# Patient Record
Sex: FEMALE | Race: WHITE | NOT HISPANIC OR LATINO | Employment: OTHER | URBAN - METROPOLITAN AREA
[De-identification: names, ages, dates, MRNs, and addresses within clinical notes are randomized per-mention and may not be internally consistent; named-entity substitution may affect disease eponyms.]

---

## 2024-02-27 ENCOUNTER — APPOINTMENT (OUTPATIENT)
Dept: CARDIOLOGY | Facility: HOSPITAL | Age: 84
DRG: 481 | End: 2024-02-27
Payer: MEDICARE

## 2024-02-27 ENCOUNTER — HOSPITAL ENCOUNTER (INPATIENT)
Facility: HOSPITAL | Age: 84
LOS: 3 days | Discharge: NURSING, REHAB OR LONG-TERM CARE | DRG: 481 | End: 2024-03-01
Attending: STUDENT IN AN ORGANIZED HEALTH CARE EDUCATION/TRAINING PROGRAM | Admitting: INTERNAL MEDICINE
Payer: MEDICARE

## 2024-02-27 ENCOUNTER — APPOINTMENT (OUTPATIENT)
Dept: RADIOLOGY | Facility: HOSPITAL | Age: 84
DRG: 481 | End: 2024-02-27
Payer: MEDICARE

## 2024-02-27 DIAGNOSIS — I44.0 FIRST DEGREE AV BLOCK: ICD-10-CM

## 2024-02-27 DIAGNOSIS — R55 SYNCOPE, UNSPECIFIED SYNCOPE TYPE: ICD-10-CM

## 2024-02-27 DIAGNOSIS — R94.31 ABNORMAL ELECTROCARDIOGRAM (ECG) (EKG): ICD-10-CM

## 2024-02-27 DIAGNOSIS — R79.89 TROPONIN LEVEL ELEVATED: ICD-10-CM

## 2024-02-27 DIAGNOSIS — K59.00 CONSTIPATION, UNSPECIFIED CONSTIPATION TYPE: ICD-10-CM

## 2024-02-27 DIAGNOSIS — I45.10 RIGHT BUNDLE BRANCH BLOCK (RBBB) ON ELECTROCARDIOGRAM (ECG): ICD-10-CM

## 2024-02-27 DIAGNOSIS — R79.89 ELEVATED TROPONIN: ICD-10-CM

## 2024-02-27 DIAGNOSIS — S72.142A CLOSED DISPLACED INTERTROCHANTERIC FRACTURE OF LEFT FEMUR, INITIAL ENCOUNTER (MULTI): Primary | ICD-10-CM

## 2024-02-27 PROBLEM — I16.1 HYPERTENSIVE EMERGENCY: Status: ACTIVE | Noted: 2024-02-27

## 2024-02-27 PROBLEM — I10 HYPERTENSION: Status: ACTIVE | Noted: 2024-02-27

## 2024-02-27 PROBLEM — M85.80 OSTEOPENIA: Status: ACTIVE | Noted: 2024-02-27

## 2024-02-27 PROBLEM — M50.30 DDD (DEGENERATIVE DISC DISEASE), CERVICAL: Status: ACTIVE | Noted: 2024-02-27

## 2024-02-27 PROBLEM — E78.5 HYPERLIPIDEMIA: Status: ACTIVE | Noted: 2024-02-27

## 2024-02-27 PROBLEM — M41.9 SCOLIOSIS: Status: ACTIVE | Noted: 2024-02-27

## 2024-02-27 PROBLEM — E87.1 HYPONATREMIA: Status: ACTIVE | Noted: 2024-02-27

## 2024-02-27 PROBLEM — M32.9 SLE (SYSTEMIC LUPUS ERYTHEMATOSUS RELATED SYNDROME) (MULTI): Status: ACTIVE | Noted: 2024-02-27

## 2024-02-27 PROBLEM — L43.9 LICHEN PLANUS: Status: ACTIVE | Noted: 2024-02-27

## 2024-02-27 PROBLEM — E03.9 HYPOTHYROIDISM: Status: ACTIVE | Noted: 2024-02-27

## 2024-02-27 LAB
ABO GROUP (TYPE) IN BLOOD: NORMAL
ALBUMIN SERPL BCP-MCNC: 4.3 G/DL (ref 3.4–5)
ALP SERPL-CCNC: 49 U/L (ref 33–136)
ALT SERPL W P-5'-P-CCNC: 19 U/L (ref 7–45)
ANION GAP SERPL CALC-SCNC: 12 MMOL/L (ref 10–20)
ANTIBODY SCREEN: NORMAL
APPEARANCE UR: CLEAR
APTT PPP: 28 SECONDS (ref 27–38)
AST SERPL W P-5'-P-CCNC: 27 U/L (ref 9–39)
BASOPHILS # BLD AUTO: 0.06 X10*3/UL (ref 0–0.1)
BASOPHILS NFR BLD AUTO: 0.4 %
BILIRUB SERPL-MCNC: 0.5 MG/DL (ref 0–1.2)
BILIRUB UR STRIP.AUTO-MCNC: NEGATIVE MG/DL
BNP SERPL-MCNC: 268 PG/ML (ref 0–99)
BUN SERPL-MCNC: 19 MG/DL (ref 6–23)
CALCIUM SERPL-MCNC: 9.2 MG/DL (ref 8.6–10.3)
CARDIAC TROPONIN I PNL SERPL HS: 156 NG/L (ref 0–13)
CARDIAC TROPONIN I PNL SERPL HS: 160 NG/L (ref 0–13)
CHLORIDE SERPL-SCNC: 96 MMOL/L (ref 98–107)
CK SERPL-CCNC: 306 U/L (ref 0–215)
CO2 SERPL-SCNC: 27 MMOL/L (ref 21–32)
COLOR UR: YELLOW
CREAT SERPL-MCNC: 0.73 MG/DL (ref 0.5–1.05)
EGFRCR SERPLBLD CKD-EPI 2021: 82 ML/MIN/1.73M*2
EOSINOPHIL # BLD AUTO: 0.09 X10*3/UL (ref 0–0.4)
EOSINOPHIL NFR BLD AUTO: 0.5 %
ERYTHROCYTE [DISTWIDTH] IN BLOOD BY AUTOMATED COUNT: 12.9 % (ref 11.5–14.5)
GLUCOSE SERPL-MCNC: 197 MG/DL (ref 74–99)
GLUCOSE UR STRIP.AUTO-MCNC: NEGATIVE MG/DL
HCT VFR BLD AUTO: 41 % (ref 36–46)
HGB BLD-MCNC: 13.5 G/DL (ref 12–16)
IMM GRANULOCYTES # BLD AUTO: 0.05 X10*3/UL (ref 0–0.5)
IMM GRANULOCYTES NFR BLD AUTO: 0.3 % (ref 0–0.9)
INR PPP: 1 (ref 0.9–1.1)
KETONES UR STRIP.AUTO-MCNC: NEGATIVE MG/DL
LEUKOCYTE ESTERASE UR QL STRIP.AUTO: NEGATIVE
LYMPHOCYTES # BLD AUTO: 1.11 X10*3/UL (ref 0.8–3)
LYMPHOCYTES NFR BLD AUTO: 6.6 %
MAGNESIUM SERPL-MCNC: 2.02 MG/DL (ref 1.6–2.4)
MCH RBC QN AUTO: 29.7 PG (ref 26–34)
MCHC RBC AUTO-ENTMCNC: 32.9 G/DL (ref 32–36)
MCV RBC AUTO: 90 FL (ref 80–100)
MONOCYTES # BLD AUTO: 0.5 X10*3/UL (ref 0.05–0.8)
MONOCYTES NFR BLD AUTO: 3 %
NEUTROPHILS # BLD AUTO: 14.99 X10*3/UL (ref 1.6–5.5)
NEUTROPHILS NFR BLD AUTO: 89.2 %
NITRITE UR QL STRIP.AUTO: NEGATIVE
NRBC BLD-RTO: 0 /100 WBCS (ref 0–0)
PH UR STRIP.AUTO: 6 [PH]
PLATELET # BLD AUTO: 216 X10*3/UL (ref 150–450)
POTASSIUM SERPL-SCNC: 4 MMOL/L (ref 3.5–5.3)
PROT SERPL-MCNC: 6.9 G/DL (ref 6.4–8.2)
PROT UR STRIP.AUTO-MCNC: NEGATIVE MG/DL
PROTHROMBIN TIME: 11.6 SECONDS (ref 9.8–12.8)
RBC # BLD AUTO: 4.55 X10*6/UL (ref 4–5.2)
RBC # UR STRIP.AUTO: NEGATIVE /UL
RH FACTOR (ANTIGEN D): NORMAL
SODIUM SERPL-SCNC: 131 MMOL/L (ref 136–145)
SP GR UR STRIP.AUTO: 1.01
UROBILINOGEN UR STRIP.AUTO-MCNC: <2 MG/DL
WBC # BLD AUTO: 16.8 X10*3/UL (ref 4.4–11.3)

## 2024-02-27 PROCEDURE — 51702 INSERT TEMP BLADDER CATH: CPT

## 2024-02-27 PROCEDURE — 2500000004 HC RX 250 GENERAL PHARMACY W/ HCPCS (ALT 636 FOR OP/ED): Performed by: EMERGENCY MEDICINE

## 2024-02-27 PROCEDURE — P9612 CATHETERIZE FOR URINE SPEC: HCPCS

## 2024-02-27 PROCEDURE — 73502 X-RAY EXAM HIP UNI 2-3 VIEWS: CPT | Mod: LT,FY

## 2024-02-27 PROCEDURE — 85025 COMPLETE CBC W/AUTO DIFF WBC: CPT | Performed by: EMERGENCY MEDICINE

## 2024-02-27 PROCEDURE — 72125 CT NECK SPINE W/O DYE: CPT

## 2024-02-27 PROCEDURE — 93005 ELECTROCARDIOGRAM TRACING: CPT

## 2024-02-27 PROCEDURE — 36415 COLL VENOUS BLD VENIPUNCTURE: CPT | Performed by: EMERGENCY MEDICINE

## 2024-02-27 PROCEDURE — 2500000001 HC RX 250 WO HCPCS SELF ADMINISTERED DRUGS (ALT 637 FOR MEDICARE OP): Performed by: INTERNAL MEDICINE

## 2024-02-27 PROCEDURE — 93010 ELECTROCARDIOGRAM REPORT: CPT | Performed by: STUDENT IN AN ORGANIZED HEALTH CARE EDUCATION/TRAINING PROGRAM

## 2024-02-27 PROCEDURE — 80053 COMPREHEN METABOLIC PANEL: CPT | Performed by: EMERGENCY MEDICINE

## 2024-02-27 PROCEDURE — 84484 ASSAY OF TROPONIN QUANT: CPT | Performed by: EMERGENCY MEDICINE

## 2024-02-27 PROCEDURE — 70450 CT HEAD/BRAIN W/O DYE: CPT

## 2024-02-27 PROCEDURE — 83880 ASSAY OF NATRIURETIC PEPTIDE: CPT | Performed by: EMERGENCY MEDICINE

## 2024-02-27 PROCEDURE — 71045 X-RAY EXAM CHEST 1 VIEW: CPT | Mod: FY

## 2024-02-27 PROCEDURE — 85610 PROTHROMBIN TIME: CPT | Performed by: EMERGENCY MEDICINE

## 2024-02-27 PROCEDURE — 70450 CT HEAD/BRAIN W/O DYE: CPT | Performed by: STUDENT IN AN ORGANIZED HEALTH CARE EDUCATION/TRAINING PROGRAM

## 2024-02-27 PROCEDURE — 73502 X-RAY EXAM HIP UNI 2-3 VIEWS: CPT | Mod: LEFT SIDE | Performed by: RADIOLOGY

## 2024-02-27 PROCEDURE — 81003 URINALYSIS AUTO W/O SCOPE: CPT | Performed by: EMERGENCY MEDICINE

## 2024-02-27 PROCEDURE — 82550 ASSAY OF CK (CPK): CPT | Performed by: SPECIALIST

## 2024-02-27 PROCEDURE — 85730 THROMBOPLASTIN TIME PARTIAL: CPT | Performed by: EMERGENCY MEDICINE

## 2024-02-27 PROCEDURE — 1200000002 HC GENERAL ROOM WITH TELEMETRY DAILY

## 2024-02-27 PROCEDURE — 72125 CT NECK SPINE W/O DYE: CPT | Performed by: STUDENT IN AN ORGANIZED HEALTH CARE EDUCATION/TRAINING PROGRAM

## 2024-02-27 PROCEDURE — 86901 BLOOD TYPING SEROLOGIC RH(D): CPT | Performed by: EMERGENCY MEDICINE

## 2024-02-27 PROCEDURE — 99222 1ST HOSP IP/OBS MODERATE 55: CPT | Performed by: INTERNAL MEDICINE

## 2024-02-27 PROCEDURE — 2500000002 HC RX 250 W HCPCS SELF ADMINISTERED DRUGS (ALT 637 FOR MEDICARE OP, ALT 636 FOR OP/ED): Performed by: INTERNAL MEDICINE

## 2024-02-27 PROCEDURE — 99285 EMERGENCY DEPT VISIT HI MDM: CPT | Mod: 25

## 2024-02-27 PROCEDURE — 71045 X-RAY EXAM CHEST 1 VIEW: CPT | Mod: COMPUTED RADIOGRAPHY X-RAY | Performed by: RADIOLOGY

## 2024-02-27 PROCEDURE — 83735 ASSAY OF MAGNESIUM: CPT | Performed by: EMERGENCY MEDICINE

## 2024-02-27 PROCEDURE — 2500000004 HC RX 250 GENERAL PHARMACY W/ HCPCS (ALT 636 FOR OP/ED): Performed by: INTERNAL MEDICINE

## 2024-02-27 PROCEDURE — 96374 THER/PROPH/DIAG INJ IV PUSH: CPT | Mod: 59

## 2024-02-27 PROCEDURE — 99285 EMERGENCY DEPT VISIT HI MDM: CPT | Performed by: STUDENT IN AN ORGANIZED HEALTH CARE EDUCATION/TRAINING PROGRAM

## 2024-02-27 RX ORDER — FENTANYL CITRATE 50 UG/ML
50 INJECTION, SOLUTION INTRAMUSCULAR; INTRAVENOUS ONCE
Status: COMPLETED | OUTPATIENT
Start: 2024-02-27 | End: 2024-02-27

## 2024-02-27 RX ORDER — HYDRALAZINE HYDROCHLORIDE 20 MG/ML
5 INJECTION INTRAMUSCULAR; INTRAVENOUS EVERY 4 HOURS PRN
Status: DISCONTINUED | OUTPATIENT
Start: 2024-02-27 | End: 2024-03-01 | Stop reason: HOSPADM

## 2024-02-27 RX ORDER — AMLODIPINE BESYLATE 2.5 MG/1
2.5 TABLET ORAL DAILY
COMMUNITY

## 2024-02-27 RX ORDER — ACETAMINOPHEN 325 MG/1
975 TABLET ORAL ONCE
Status: DISCONTINUED | OUTPATIENT
Start: 2024-02-27 | End: 2024-02-27

## 2024-02-27 RX ORDER — HYDROXYCHLOROQUINE SULFATE 200 MG/1
200 TABLET, FILM COATED ORAL 2 TIMES DAILY
Status: DISCONTINUED | OUTPATIENT
Start: 2024-02-27 | End: 2024-03-01 | Stop reason: HOSPADM

## 2024-02-27 RX ORDER — SODIUM CHLORIDE 9 MG/ML
50 INJECTION, SOLUTION INTRAVENOUS CONTINUOUS
Status: DISCONTINUED | OUTPATIENT
Start: 2024-02-28 | End: 2024-02-27

## 2024-02-27 RX ORDER — ASPIRIN 325 MG
325 TABLET ORAL ONCE
Status: DISCONTINUED | OUTPATIENT
Start: 2024-02-27 | End: 2024-02-27

## 2024-02-27 RX ORDER — LEVOTHYROXINE SODIUM 112 UG/1
112 TABLET ORAL
COMMUNITY

## 2024-02-27 RX ORDER — HYDROXYCHLOROQUINE SULFATE 200 MG/1
200 TABLET, FILM COATED ORAL 2 TIMES DAILY
COMMUNITY

## 2024-02-27 RX ORDER — FAMOTIDINE 20 MG/1
20 TABLET, FILM COATED ORAL NIGHTLY
Status: DISCONTINUED | OUTPATIENT
Start: 2024-02-27 | End: 2024-03-01 | Stop reason: HOSPADM

## 2024-02-27 RX ORDER — LISINOPRIL 20 MG/1
20 TABLET ORAL DAILY
Status: DISCONTINUED | OUTPATIENT
Start: 2024-02-27 | End: 2024-03-01 | Stop reason: HOSPADM

## 2024-02-27 RX ORDER — SIMVASTATIN 20 MG/1
10 TABLET, FILM COATED ORAL NIGHTLY
COMMUNITY

## 2024-02-27 RX ORDER — CHOLECALCIFEROL (VITAMIN D3) 50 MCG
50 TABLET ORAL DAILY
COMMUNITY

## 2024-02-27 RX ORDER — NABUMETONE 500 MG/1
1000 TABLET, FILM COATED ORAL DAILY
COMMUNITY

## 2024-02-27 RX ORDER — LEVOTHYROXINE SODIUM 112 UG/1
112 TABLET ORAL
Status: DISCONTINUED | OUTPATIENT
Start: 2024-02-28 | End: 2024-03-01 | Stop reason: HOSPADM

## 2024-02-27 RX ORDER — HEPARIN SODIUM 5000 [USP'U]/ML
5000 INJECTION, SOLUTION INTRAVENOUS; SUBCUTANEOUS EVERY 8 HOURS SCHEDULED
Status: DISCONTINUED | OUTPATIENT
Start: 2024-02-27 | End: 2024-02-28

## 2024-02-27 RX ORDER — SENNOSIDES 8.6 MG/1
2 TABLET ORAL 2 TIMES DAILY
Status: DISCONTINUED | OUTPATIENT
Start: 2024-02-27 | End: 2024-03-01 | Stop reason: HOSPADM

## 2024-02-27 RX ORDER — OXYCODONE HYDROCHLORIDE 5 MG/1
5 TABLET ORAL EVERY 6 HOURS PRN
Status: ACTIVE | OUTPATIENT
Start: 2024-02-27 | End: 2024-02-27

## 2024-02-27 RX ORDER — AMLODIPINE BESYLATE 2.5 MG/1
2.5 TABLET ORAL DAILY
Status: DISCONTINUED | OUTPATIENT
Start: 2024-02-27 | End: 2024-03-01 | Stop reason: HOSPADM

## 2024-02-27 RX ORDER — SIMVASTATIN 10 MG/1
10 TABLET, FILM COATED ORAL NIGHTLY
Status: DISCONTINUED | OUTPATIENT
Start: 2024-02-27 | End: 2024-03-01 | Stop reason: HOSPADM

## 2024-02-27 RX ORDER — ACETAMINOPHEN 325 MG/1
975 TABLET ORAL EVERY 8 HOURS PRN
Status: DISCONTINUED | OUTPATIENT
Start: 2024-02-27 | End: 2024-02-28

## 2024-02-27 RX ORDER — CHOLECALCIFEROL (VITAMIN D3) 25 MCG
2000 TABLET ORAL DAILY
Status: DISCONTINUED | OUTPATIENT
Start: 2024-02-27 | End: 2024-03-01 | Stop reason: HOSPADM

## 2024-02-27 RX ORDER — MORPHINE SULFATE 4 MG/ML
4 INJECTION, SOLUTION INTRAMUSCULAR; INTRAVENOUS ONCE
Status: DISCONTINUED | OUTPATIENT
Start: 2024-02-27 | End: 2024-02-27

## 2024-02-27 RX ORDER — TRAVOPROST OPHTHALMIC SOLUTION 0.04 MG/ML
1 SOLUTION OPHTHALMIC NIGHTLY
COMMUNITY

## 2024-02-27 RX ORDER — FAMOTIDINE 20 MG/1
20 TABLET, FILM COATED ORAL NIGHTLY
COMMUNITY

## 2024-02-27 RX ORDER — SODIUM CHLORIDE 9 MG/ML
50 INJECTION, SOLUTION INTRAVENOUS CONTINUOUS
Status: DISCONTINUED | OUTPATIENT
Start: 2024-02-27 | End: 2024-02-28

## 2024-02-27 RX ORDER — LATANOPROST 50 UG/ML
1 SOLUTION/ DROPS OPHTHALMIC NIGHTLY
Status: DISCONTINUED | OUTPATIENT
Start: 2024-02-27 | End: 2024-03-01 | Stop reason: HOSPADM

## 2024-02-27 RX ORDER — LISINOPRIL 20 MG/1
20 TABLET ORAL DAILY
Status: DISCONTINUED | OUTPATIENT
Start: 2024-02-27 | End: 2024-02-27

## 2024-02-27 RX ORDER — LISINOPRIL 20 MG/1
20 TABLET ORAL DAILY
COMMUNITY

## 2024-02-27 RX ADMIN — AMLODIPINE BESYLATE 2.5 MG: 2.5 TABLET ORAL at 18:57

## 2024-02-27 RX ADMIN — SODIUM CHLORIDE 1000 ML: 9 INJECTION, SOLUTION INTRAVENOUS at 13:30

## 2024-02-27 RX ADMIN — LISINOPRIL 20 MG: 20 TABLET ORAL at 21:54

## 2024-02-27 RX ADMIN — SIMVASTATIN 10 MG: 10 TABLET, FILM COATED ORAL at 21:54

## 2024-02-27 RX ADMIN — FENTANYL CITRATE 50 MCG: 50 INJECTION, SOLUTION INTRAMUSCULAR; INTRAVENOUS at 13:15

## 2024-02-27 RX ADMIN — SODIUM CHLORIDE 50 ML/HR: 9 INJECTION, SOLUTION INTRAVENOUS at 22:20

## 2024-02-27 RX ADMIN — HYDROMORPHONE HYDROCHLORIDE 0.4 MG: 1 INJECTION, SOLUTION INTRAMUSCULAR; INTRAVENOUS; SUBCUTANEOUS at 18:57

## 2024-02-27 RX ADMIN — LATANOPROST 1 DROP: 50 SOLUTION OPHTHALMIC at 22:20

## 2024-02-27 RX ADMIN — HEPARIN SODIUM 5000 UNITS: 5000 INJECTION INTRAVENOUS; SUBCUTANEOUS at 18:57

## 2024-02-27 RX ADMIN — SENNOSIDES 17.2 MG: 8.6 TABLET, FILM COATED ORAL at 21:54

## 2024-02-27 RX ADMIN — FAMOTIDINE 20 MG: 20 TABLET, FILM COATED ORAL at 21:54

## 2024-02-27 RX ADMIN — HYDROXYCHLOROQUINE SULFATE 200 MG: 200 TABLET, FILM COATED ORAL at 21:54

## 2024-02-27 SDOH — SOCIAL STABILITY: SOCIAL INSECURITY: WERE YOU ABLE TO COMPLETE ALL THE BEHAVIORAL HEALTH SCREENINGS?: YES

## 2024-02-27 SDOH — SOCIAL STABILITY: SOCIAL INSECURITY: ABUSE: ADULT

## 2024-02-27 SDOH — SOCIAL STABILITY: SOCIAL INSECURITY: DO YOU FEEL ANYONE HAS EXPLOITED OR TAKEN ADVANTAGE OF YOU FINANCIALLY OR OF YOUR PERSONAL PROPERTY?: NO

## 2024-02-27 SDOH — SOCIAL STABILITY: SOCIAL INSECURITY: HAVE YOU HAD THOUGHTS OF HARMING ANYONE ELSE?: NO

## 2024-02-27 SDOH — SOCIAL STABILITY: SOCIAL INSECURITY: DOES ANYONE TRY TO KEEP YOU FROM HAVING/CONTACTING OTHER FRIENDS OR DOING THINGS OUTSIDE YOUR HOME?: NO

## 2024-02-27 SDOH — SOCIAL STABILITY: SOCIAL INSECURITY: ARE YOU OR HAVE YOU BEEN THREATENED OR ABUSED PHYSICALLY, EMOTIONALLY, OR SEXUALLY BY ANYONE?: NO

## 2024-02-27 SDOH — SOCIAL STABILITY: SOCIAL INSECURITY: DO YOU FEEL UNSAFE GOING BACK TO THE PLACE WHERE YOU ARE LIVING?: NO

## 2024-02-27 SDOH — SOCIAL STABILITY: SOCIAL INSECURITY: HAS ANYONE EVER THREATENED TO HURT YOUR FAMILY OR YOUR PETS?: NO

## 2024-02-27 SDOH — SOCIAL STABILITY: SOCIAL INSECURITY: ARE THERE ANY APPARENT SIGNS OF INJURIES/BEHAVIORS THAT COULD BE RELATED TO ABUSE/NEGLECT?: NO

## 2024-02-27 ASSESSMENT — COLUMBIA-SUICIDE SEVERITY RATING SCALE - C-SSRS
2. HAVE YOU ACTUALLY HAD ANY THOUGHTS OF KILLING YOURSELF?: NO
6. HAVE YOU EVER DONE ANYTHING, STARTED TO DO ANYTHING, OR PREPARED TO DO ANYTHING TO END YOUR LIFE?: NO
1. IN THE PAST MONTH, HAVE YOU WISHED YOU WERE DEAD OR WISHED YOU COULD GO TO SLEEP AND NOT WAKE UP?: NO

## 2024-02-27 ASSESSMENT — ACTIVITIES OF DAILY LIVING (ADL)
ADEQUATE_TO_COMPLETE_ADL: YES
HEARING - LEFT EAR: DIFFICULTY WITH NOISE
BATHING: INDEPENDENT
PATIENT'S MEMORY ADEQUATE TO SAFELY COMPLETE DAILY ACTIVITIES?: YES
JUDGMENT_ADEQUATE_SAFELY_COMPLETE_DAILY_ACTIVITIES: YES
HEARING - RIGHT EAR: DIFFICULTY WITH NOISE
LACK_OF_TRANSPORTATION: NO
TOILETING: INDEPENDENT
WALKS IN HOME: INDEPENDENT
FEEDING YOURSELF: INDEPENDENT
DRESSING YOURSELF: INDEPENDENT
GROOMING: INDEPENDENT

## 2024-02-27 ASSESSMENT — COGNITIVE AND FUNCTIONAL STATUS - GENERAL
DAILY ACTIVITIY SCORE: 19
EATING MEALS: A LITTLE
MOVING TO AND FROM BED TO CHAIR: A LITTLE
STANDING UP FROM CHAIR USING ARMS: A LITTLE
DRESSING REGULAR UPPER BODY CLOTHING: A LITTLE
HELP NEEDED FOR BATHING: A LITTLE
WALKING IN HOSPITAL ROOM: A LITTLE
DAILY ACTIVITIY SCORE: 18
WALKING IN HOSPITAL ROOM: A LITTLE
CLIMB 3 TO 5 STEPS WITH RAILING: A LITTLE
DRESSING REGULAR LOWER BODY CLOTHING: A LITTLE
TOILETING: A LITTLE
PERSONAL GROOMING: A LITTLE
TURNING FROM BACK TO SIDE WHILE IN FLAT BAD: A LITTLE
MOBILITY SCORE: 18
CLIMB 3 TO 5 STEPS WITH RAILING: A LITTLE
PERSONAL GROOMING: A LITTLE
MOVING FROM LYING ON BACK TO SITTING ON SIDE OF FLAT BED WITH BEDRAILS: A LITTLE
PATIENT BASELINE BEDBOUND: NO
MOVING FROM LYING ON BACK TO SITTING ON SIDE OF FLAT BED WITH BEDRAILS: A LITTLE
STANDING UP FROM CHAIR USING ARMS: A LITTLE
DRESSING REGULAR UPPER BODY CLOTHING: A LITTLE
HELP NEEDED FOR BATHING: A LITTLE
MOBILITY SCORE: 18
MOVING TO AND FROM BED TO CHAIR: A LITTLE
DRESSING REGULAR LOWER BODY CLOTHING: A LITTLE
TURNING FROM BACK TO SIDE WHILE IN FLAT BAD: A LITTLE
TOILETING: A LITTLE

## 2024-02-27 ASSESSMENT — LIFESTYLE VARIABLES
SKIP TO QUESTIONS 9-10: 1
AUDIT-C TOTAL SCORE: 0
EVER HAD A DRINK FIRST THING IN THE MORNING TO STEADY YOUR NERVES TO GET RID OF A HANGOVER: NO
HAVE YOU EVER FELT YOU SHOULD CUT DOWN ON YOUR DRINKING: NO
HOW OFTEN DO YOU HAVE A DRINK CONTAINING ALCOHOL: NEVER
HOW OFTEN DO YOU HAVE 6 OR MORE DRINKS ON ONE OCCASION: NEVER
HAVE PEOPLE ANNOYED YOU BY CRITICIZING YOUR DRINKING: NO
SUBSTANCE_ABUSE_PAST_12_MONTHS: NO
HOW MANY STANDARD DRINKS CONTAINING ALCOHOL DO YOU HAVE ON A TYPICAL DAY: PATIENT DOES NOT DRINK
EVER FELT BAD OR GUILTY ABOUT YOUR DRINKING: NO
PRESCIPTION_ABUSE_PAST_12_MONTHS: NO
AUDIT-C TOTAL SCORE: 0

## 2024-02-27 ASSESSMENT — PAIN - FUNCTIONAL ASSESSMENT
PAIN_FUNCTIONAL_ASSESSMENT: 0-10

## 2024-02-27 ASSESSMENT — PATIENT HEALTH QUESTIONNAIRE - PHQ9
2. FEELING DOWN, DEPRESSED OR HOPELESS: NOT AT ALL
1. LITTLE INTEREST OR PLEASURE IN DOING THINGS: NOT AT ALL
SUM OF ALL RESPONSES TO PHQ9 QUESTIONS 1 & 2: 0

## 2024-02-27 ASSESSMENT — PAIN DESCRIPTION - PAIN TYPE: TYPE: ACUTE PAIN

## 2024-02-27 ASSESSMENT — PAIN SCALES - GENERAL
PAINLEVEL_OUTOF10: 7
PAINLEVEL_OUTOF10: 5 - MODERATE PAIN
PAINLEVEL_OUTOF10: 3
PAINLEVEL_OUTOF10: 6

## 2024-02-27 ASSESSMENT — PAIN DESCRIPTION - DESCRIPTORS: DESCRIPTORS: ACHING

## 2024-02-27 ASSESSMENT — PAIN DESCRIPTION - FREQUENCY: FREQUENCY: CONSTANT/CONTINUOUS

## 2024-02-27 ASSESSMENT — PAIN DESCRIPTION - ORIENTATION
ORIENTATION: POSTERIOR
ORIENTATION: LEFT

## 2024-02-27 ASSESSMENT — PAIN DESCRIPTION - LOCATION
LOCATION: HIP
LOCATION: BUTTOCKS

## 2024-02-27 ASSESSMENT — PAIN DESCRIPTION - PROGRESSION: CLINICAL_PROGRESSION: NOT CHANGED

## 2024-02-27 NOTE — ED NOTES
1810--This RN spoke to Dr. Santana regarding elevated troponins.  Per Dr. Santana patient can go to acute care on the 4th floor.  Updated RN on floor.       Keisha Soni RN  02/27/24 9875

## 2024-02-27 NOTE — ED PROVIDER NOTES
EMERGENCY DEPARTMENT ENCOUNTER      Pt Name: Meka Shelby  MRN: [id number]  Birthdate 1940  Date of evaluation: 2/27/2024  Provider: Adalid Mays DO    CHIEF COMPLAINT       Chief Complaint   Patient presents with    Fall     HISTORY OF PRESENT ILLNESS    Meka Shelby is a 83 y.o. year old female PMH notable notable for SLE, HTN, hypothyroid, DDD who presents to the ER for ground-level fall.  The patient states that 1 to 2 hours prior to arrival she felt that she suddenly lost her balance and landed on her butt.  She has not walked since.  She did not hit her head, did not pass out, did not feel like she was going to pass out, denies chest pain shortness of breath nausea vomiting changes to urine or stool fever.  She states that her dizziness has resolved.  She lives alone, does not have any stairs at home.     PAST MEDICAL HISTORY   No past medical history on file.  CURRENT MEDICATIONS       Previous Medications    No medications on file     SURGICAL HISTORY     No past surgical history on file.  ALLERGIES     Patient has no known allergies.  FAMILY HISTORY     No family history on file.  SOCIAL HISTORY       Social History     Tobacco Use    Smoking status: Not on file    Smokeless tobacco: Not on file   Substance Use Topics    Alcohol use: Not on file    Drug use: Not on file     PHYSICAL EXAM  (up to 7 for level 4, 8 or more for level 5)     ED Triage Vitals [02/27/24 1112]   Temperature Heart Rate Respirations BP   36.1 °C (97 °F) 64 16 (!) 186/81      Pulse Ox Temp Source Heart Rate Source Patient Position   94 % Temporal Monitor Sitting      BP Location FiO2 (%)     Right arm --       Physical Exam  Vitals and nursing note reviewed.   Constitutional:       General: She is not in acute distress.     Appearance: Normal appearance. She is not ill-appearing, toxic-appearing or diaphoretic.   HENT:      Head: Normocephalic and atraumatic. No contusion or laceration.      Jaw: No trismus or malocclusion.       Right Ear: External ear normal.      Left Ear: External ear normal.      Mouth/Throat:      Mouth: Mucous membranes are moist.      Pharynx: Oropharynx is clear.   Eyes:      Extraocular Movements: Extraocular movements intact.      Pupils: Pupils are equal, round, and reactive to light.   Neck:      Trachea: No tracheal deviation.   Cardiovascular:      Rate and Rhythm: Normal rate and regular rhythm.      Pulses: Normal pulses.           Radial pulses are 2+ on the right side and 2+ on the left side.        Dorsalis pedis pulses are 2+ on the right side and 2+ on the left side.   Pulmonary:      Effort: Pulmonary effort is normal. No respiratory distress.      Breath sounds: No stridor. No wheezing, rhonchi or rales.   Chest:      Chest wall: No tenderness.   Abdominal:      General: Abdomen is flat. There is no distension.      Palpations: Abdomen is soft. There is no mass.      Tenderness: There is no abdominal tenderness. There is no right CVA tenderness, left CVA tenderness, guarding or rebound.   Musculoskeletal:         General: No signs of injury.      Right shoulder: No deformity or tenderness. Normal range of motion.      Left shoulder: No deformity or tenderness. Normal range of motion.      Right upper arm: No deformity or tenderness.      Left upper arm: No deformity or tenderness.      Right elbow: No deformity. Normal range of motion. No tenderness.      Left elbow: No deformity. Normal range of motion. No tenderness.      Right forearm: No deformity or tenderness.      Left forearm: No deformity or tenderness.      Right wrist: No deformity, tenderness or snuff box tenderness. Normal range of motion.      Left wrist: No deformity, tenderness or snuff box tenderness. Normal range of motion.      Right hand: No tenderness. Normal range of motion.      Left hand: No tenderness. Normal range of motion.      Cervical back: Normal range of motion and neck supple. No deformity or tenderness.      Thoracic  back: No deformity or tenderness.      Lumbar back: No deformity or tenderness.      Right hip: No deformity or tenderness. Normal range of motion.      Left hip: Deformity (Shortened, externally rotated) and tenderness present. Decreased range of motion.      Right upper leg: No deformity or tenderness.      Left upper leg: Tenderness (Upper thigh) present. No deformity.      Right knee: No deformity. Normal range of motion. No tenderness.      Left knee: No deformity. Normal range of motion. No tenderness.      Right lower leg: No deformity or tenderness. No edema.      Left lower leg: No deformity or tenderness. No edema.      Right ankle: No deformity. No tenderness.      Left ankle: No deformity. No tenderness.      Right foot: Normal range of motion. No deformity or tenderness.      Left foot: Normal range of motion. No deformity or tenderness.   Skin:     General: Skin is warm and dry.      Capillary Refill: Capillary refill takes less than 2 seconds.      Coloration: Skin is not jaundiced or pale.      Findings: No bruising, lesion, rash or wound.   Neurological:      General: No focal deficit present.      Mental Status: She is alert. Mental status is at baseline.      Cranial Nerves: No cranial nerve deficit.      Sensory: No sensory deficit.      Motor: No weakness.      Coordination: Coordination normal.   Psychiatric:         Speech: Speech normal.         Behavior: Behavior normal.         Thought Content: Thought content does not include homicidal or suicidal ideation.         Judgment: Judgment normal.        DIAGNOSTIC RESULTS   LABS:  Labs Reviewed   CBC WITH AUTO DIFFERENTIAL - Abnormal       Result Value    WBC 16.8 (*)     nRBC 0.0      RBC 4.55      Hemoglobin 13.5      Hematocrit 41.0      MCV 90      MCH 29.7      MCHC 32.9      RDW 12.9      Platelets 216      Neutrophils % 89.2      Immature Granulocytes %, Automated 0.3      Lymphocytes % 6.6      Monocytes % 3.0      Eosinophils % 0.5       Basophils % 0.4      Neutrophils Absolute 14.99 (*)     Immature Granulocytes Absolute, Automated 0.05      Lymphocytes Absolute 1.11      Monocytes Absolute 0.50      Eosinophils Absolute 0.09      Basophils Absolute 0.06     COMPREHENSIVE METABOLIC PANEL - Abnormal    Glucose 197 (*)     Sodium 131 (*)     Potassium 4.0      Chloride 96 (*)     Bicarbonate 27      Anion Gap 12      Urea Nitrogen 19      Creatinine 0.73      eGFR 82      Calcium 9.2      Albumin 4.3      Alkaline Phosphatase 49      Total Protein 6.9      AST 27      Bilirubin, Total 0.5      ALT 19     SERIAL TROPONIN-INITIAL - Abnormal    Troponin I, High Sensitivity 156 (*)     Narrative:     Less than 99th percentile of normal range cutoff-  Female and children under 18 years old <14 ng/L; Male <21 ng/L: Negative  Repeat testing should be performed if clinically indicated.     Female and children under 18 years old 14-50 ng/L; Male 21-50 ng/L:  Consistent with possible cardiac damage and possible increased clinical   risk. Serial measurements may help to assess extent of myocardial damage.     >50 ng/L: Consistent with cardiac damage, increased clinical risk and  myocardial infarction. Serial measurements may help assess extent of   myocardial damage.      NOTE: Children less than 1 year old may have higher baseline troponin   levels and results should be interpreted in conjunction with the overall   clinical context.     NOTE: Troponin I testing is performed using a different   testing methodology at [facility] than at other   [facility]. Direct result comparisons should only   be made within the same method.   SERIAL TROPONIN, 1 HOUR - Abnormal    Troponin I, High Sensitivity 160 (*)     Narrative:     Less than 99th percentile of normal range cutoff-  Female and children under 18 years old <14 ng/L; Male <21 ng/L: Negative  Repeat testing should be performed if clinically indicated.     Female and children under 18  years old 14-50 ng/L; Male 21-50 ng/L:  Consistent with possible cardiac damage and possible increased clinical   risk. Serial measurements may help to assess extent of myocardial damage.     >50 ng/L: Consistent with cardiac damage, increased clinical risk and  myocardial infarction. Serial measurements may help assess extent of   myocardial damage.      NOTE: Children less than 1 year old may have higher baseline troponin   levels and results should be interpreted in conjunction with the overall   clinical context.     NOTE: Troponin I testing is performed using a different   testing methodology at [facility] than at other   [facility]. Direct result comparisons should only   be made within the same method.   B-TYPE NATRIURETIC PEPTIDE - Abnormal     (*)     Narrative:        <100 pg/mL - Heart failure unlikely  100-299 pg/mL - Intermediate probability of acute heart                  failure exacerbation. Correlate with clinical                  context and patient history.    >=300 pg/mL - Heart Failure likely. Correlate with clinical                  context and patient history.    BNP testing is performed using different testing methodology at [facility] than at other [facility]. Direct result comparisons should only be made within the same method.      APTT - Normal    aPTT 28      Narrative:     The APTT is no longer used for monitoring Unfractionated Heparin Therapy. For monitoring Heparin Therapy, use the Heparin Assay.   PROTIME-INR - Normal    Protime 11.6      INR 1.0     MAGNESIUM - Normal    Magnesium 2.02     TROPONIN SERIES- (INITIAL, 1 HR)    Narrative:     The following orders were created for panel order Troponin I Series, High Sensitivity (0, 1 HR).  Procedure                               Abnormality         Status                     ---------                               -----------         ------                     Troponin I, High  Sensiti...[[id number]]  Abnormal            Final result               Troponin, High Sensitivi...[[id number]]  Abnormal            Final result                 Please view results for these tests on the individual orders.   TYPE AND SCREEN    ABO TYPE B      Rh TYPE POS      ANTIBODY SCREEN NEG     URINALYSIS WITH REFLEX CULTURE AND MICROSCOPIC    Narrative:     The following orders were created for panel order Urinalysis with Reflex Culture and Microscopic.  Procedure                               Abnormality         Status                     ---------                               -----------         ------                     Urinalysis with Reflex C...[[id number]]                                                 Extra Urine Gray Tube[[id number]]                                                         Please view results for these tests on the individual orders.   URINALYSIS WITH REFLEX CULTURE AND MICROSCOPIC   EXTRA URINE GRAY TUBE     All other labs were within normal range or not returned as of this dictation.  Imaging  CT head wo IV contrast   Final Result   No acute intracranial abnormality or calvarial fracture.        MACRO   None        Signed by: Angélica Jansen 2/27/2024 2:36 PM   Dictation workstation:   [id number]      CT cervical spine wo IV contrast   Final Result   No acute fracture or traumatic malalignment.        Multilevel spondylosis, most pronounced at C6-C7 with moderate to   severe spinal canal stenosis and severe bilateral neural foraminal   narrowing.        Signed by: Angélica Jansen 2/27/2024 2:40 PM   Dictation workstation:   [id number]      XR chest 1 view   Final Result   Hypoventilatory exam. Elevation of the right diaphragm.        Mild cardiomegaly.  Currently without radiographic evidence of CHF or   pneumonia.        MACRO:   None        Signed by: Qamar Marcum 2/27/2024 1:13 PM   Dictation workstation:   [id number]      XR hip left with pelvis when performed 2 or 3 views  "  Final Result   Acute comminuted impacted and angulated fracture through the   intertrochanteric proximal left femur as described.        MACRO:   None        Signed by: Qamar Marcum 2/27/2024 1:14 PM   Dictation workstation:   [id number]         Procedure  Procedures  EMERGENCY DEPARTMENT COURSE/MDM:   Medical Decision Making    Vitals:    Vitals:    02/27/24 1112 02/27/24 1326   BP: (!) 186/81 (!) 201/87   BP Location: Right arm    Patient Position: Sitting    Pulse: 64 72   Resp: 16 16   Temp: 36.1 °C (97 °F)    TempSrc: Temporal    SpO2: 94% 95%   Weight: 59 kg (130 lb)    Height: 1.575 m (5' 2\")      Meka Shelby is a female 83 y.o. who presents to the ER for ground-level fall. On arrival the patients vital signs were: Afebrile, Reguar HR, Hypertensive, Regular RR, and Oxygenating well on room air. History obtained from: patient.  Given fall due to dizziness cardiac workup initiated along with CT head and C-spine, given shortened externally rotated left hip pelvic x-ray ordered.  fentanyl provided for analgesia.    EKG 1336 Rhythm: Sinus Ventricular rate: 64 Intervals (-200 /QRS  /QT >450M or >470F):   Qtc 462 Blocks: First degree AV block and Right bundle branch block Potential signs of ischemia: No pathological Q waves, contiguous ST elevations or depressions, biphasic T waves, or  T wave inversions     ED Course as of 02/27/24 1559   Tue Feb 27, 2024   1235 Troponin I Series, High Sensitivity (0, 1 HR)(!!)  Elevated, right bundle dee block noted on EKG as well as first-degree AV block, no prior EKG available for comparison.  No active chest pain.  Aspirin ordered. [CB]   1332 XR hip left with pelvis when performed 2 or 3 views  Acute comminuted impacted and angulated fracture through the intertrochanteric proximal left femur [CB]   1348 EKG independently interpreted by attending physician    1336 hrs.: Sinus rhythm with first-degree AV block with ventricular to 64 bpm.  QTc 462.  " .  Left axis deviation.  Right bundle branch block.  No acute injury pattern seen. [AI]   1349 Discussed with daren Alexander, who recommended NPO, admit to medicine, ASA for elevated trop okay [CB]   1455 Comprehensive Metabolic Panel(!)  Mild hyponatremia without additional electrolyte abnormality, no acute hepatorenal abnormality [CB]   1456 Protime-INR  No acute coagulopathy [CB]   1456 CBC and Auto Differential(!)  Leukocytosis likely 2/2 known fracture, no acute anemia thrombocytopenia or thrombocytosis [CB]   1456 XR chest 1 view  Cardiomegaly, no signs of pneumonia pneumothorax or widened mediastinum [CB]   1457 CT cervical spine wo IV contrast  No acute traumatic injury of C-spine, skull, or brain [CB]   1513 Call placed for admission [CB]   1558 Troponin I, High Sensitivity(!!): 160  Repeat shows no significant delta [CB]      ED Course User Index  [AI] Celina Ramirez DO  [CB] Adalid Mays DO         Diagnoses as of 02/27/24 1559   Closed displaced intertrochanteric fracture of left femur, initial encounter (CMS/[facility])   Syncope, unspecified syncope type   Elevated troponin   First degree AV block   Right bundle branch block (RBBB) on electrocardiogram (ECG)     External Records Reviewed: I reviewed recent and relevant outside records including inpatient notes, outpatient records    Shared decision making for disposition  Patient and/or patient´s representative was counseled regarding labs, imaging, likely diagnosis. All questions were answered. Recommendation was made   for Admission given the need for further escalation of care to inpatient management. Patient agreed and was admitted in stable condition. Admitting team was notified of any pending labs or imaging to ensure continuity of care.     ED Medications administered this visit:    Medications   aspirin tablet 325 mg (has no administration in time range)   fentaNYL PF (Sublimaze) injection 50 mcg (50 mcg intravenous Given 2/27/24  1315)   sodium chloride 0.9 % bolus 1,000 mL (1,000 mL intravenous New Bag 2/27/24 1330)          Final Impression:   1. Closed displaced intertrochanteric fracture of left femur, initial encounter (CMS/[facility])    2. Syncope, unspecified syncope type    3. Elevated troponin    4. First degree AV block    5. Right bundle branch block (RBBB) on electrocardiogram (ECG)          I reviewed the case with the attending ED physician. The attending ED physician agrees with the plan.   Adalid Mays DO  PGY-2  Emergency Medicine      Please excuse any misspellings or unintended errors related to the Dragon speech recognition software used to dictate this note.       Adalid Mays DO  Resident  02/27/24 3239    The patient was seen by the resident/fellow.  I have personally performed a substantive portion of the encounter.  I have seen and examined the patient; agree with the workup, evaluation, MDM, management and diagnosis.  The care plan has been discussed with the resident; I have reviewed the resident’s note and agree with the documented findings.           Celina Ramirez DO  02/28/24 2459

## 2024-02-27 NOTE — H&P
History Of Present Illness  Meka Shelby is a 83 y.o. female presenting with fall.     Is a very pleasant 83-year-old lady who is joined by her daughter who assists in providing some of the history, although the patient is noted to be an adequate historian.  The patient came to this facility after sustaining a fall in her home, this happened approximately 1 to 2 hours prior to her arrival, she states that she lost her balance and landed on her rear end, she did not feel like she was able to walk since that time due to pain limiting her ability, namely in the left hip area.  She did not hit her head, she did not lose consciousness, she had no prodrome with lightheadedness, there has been no chest pain, shortness of breath, weakness or tingling, she has had no recent illness, she was not incontinent of urine or feces during the episode.  She did have some transient dizziness after the fall that she thinks was related to the intense pain, although at the time of interview and examination (after having been medicated, see below), she did not have any current complaints other than baseline difficulty with hearing.  She does live alone and required calling for help in order to be able to come to this facility for further workup and evaluation.    On arrival to the emergency department she was afebrile, her heart rate was 64, respiratory rate 16, she was hypertensive to 186/81, SpO2 94% on ambient air; labs notable for leukocytosis and mild hyponatremia, high-sensitivity troponin 156 -> 160, ECG with sinus rhythm with first-degree AV block, no acute pathology on CT of the head, no acute fracture or traumatic malalignment on CT of the C-spine, plain radiograph of the chest with mild cardiomegaly    Past Medical History  Systemic lupus erythematosus, scoliosis, osteopenia, lichen planus, hypothyroidism, hypertension, hyperlipidemia, cervical DDD    Surgical History  She has no past surgical history on file.     Social  History  She has no history on file for tobacco use, alcohol use, and drug use.    Family History  No family history on file.     Allergies  Patient has no known allergies.    Review of Systems   All other systems reviewed and are negative.       Physical Exam  Vitals reviewed.   Constitutional:       General: She is not in acute distress.     Appearance: Normal appearance. She is not ill-appearing.   HENT:      Head: Normocephalic and atraumatic.      Nose: Nose normal.      Mouth/Throat:      Mouth: Mucous membranes are moist.   Eyes:      Extraocular Movements: Extraocular movements intact.      Pupils: Pupils are equal, round, and reactive to light.   Cardiovascular:      Rate and Rhythm: Normal rate and regular rhythm.      Pulses: Normal pulses.      Heart sounds: Normal heart sounds. No murmur heard.     No friction rub. No gallop.   Pulmonary:      Effort: Pulmonary effort is normal. No respiratory distress.      Breath sounds: Normal breath sounds. No wheezing, rhonchi or rales.   Abdominal:      General: Abdomen is flat. Bowel sounds are normal. There is no distension.      Palpations: Abdomen is soft. There is no mass.      Tenderness: There is no abdominal tenderness. There is no guarding or rebound.   Musculoskeletal:         General: No swelling, tenderness or signs of injury.      Right lower leg: No edema.      Left lower leg: No edema.   Skin:     General: Skin is warm and dry.      Coloration: Skin is not jaundiced or pale.      Findings: No bruising, erythema, lesion or rash.   Neurological:      General: No focal deficit present.      Mental Status: She is alert and oriented to person, place, and time. Mental status is at baseline.      Cranial Nerves: No cranial nerve deficit.      Motor: No weakness.   Psychiatric:         Mood and Affect: Mood normal.         Behavior: Behavior normal.         Thought Content: Thought content normal.         Judgment: Judgment normal.          Last Recorded  Vitals  /88 (BP Location: Right arm, Patient Position: Lying)   Pulse 64   Temp 36.4 °C (97.5 °F) (Temporal)   Resp 16   Wt 59 kg (130 lb)   SpO2 95%     Relevant Results  Scheduled medications  amLODIPine, 2.5 mg, oral, Daily  cholecalciferol, 2,000 Units, oral, Daily  famotidine, 20 mg, oral, Nightly  heparin (porcine), 5,000 Units, subcutaneous, q8h OLIVIA  hydroxychloroquine, 200 mg, oral, BID  latanoprost, 1 drop, Both Eyes, Nightly  [START ON 2/28/2024] levothyroxine, 112 mcg, oral, Daily before breakfast  lisinopril, 20 mg, oral, Daily  sennosides, 2 tablet, oral, BID  simvastatin, 10 mg, oral, Nightly      Continuous medications     PRN medications  PRN medications: acetaminophen, hydrALAZINE, HYDROmorphone, HYDROmorphone, oxyCODONE  Results for orders placed or performed during the hospital encounter of 02/27/24 (from the past 24 hour(s))   CBC and Auto Differential   Result Value Ref Range    WBC 16.8 (H) 4.4 - 11.3 x10*3/uL    nRBC 0.0 0.0 - 0.0 /100 WBCs    RBC 4.55 4.00 - 5.20 x10*6/uL    Hemoglobin 13.5 12.0 - 16.0 g/dL    Hematocrit 41.0 36.0 - 46.0 %    MCV 90 80 - 100 fL    MCH 29.7 26.0 - 34.0 pg    MCHC 32.9 32.0 - 36.0 g/dL    RDW 12.9 11.5 - 14.5 %    Platelets 216 150 - 450 x10*3/uL    Neutrophils % 89.2 40.0 - 80.0 %    Immature Granulocytes %, Automated 0.3 0.0 - 0.9 %    Lymphocytes % 6.6 13.0 - 44.0 %    Monocytes % 3.0 2.0 - 10.0 %    Eosinophils % 0.5 0.0 - 6.0 %    Basophils % 0.4 0.0 - 2.0 %    Neutrophils Absolute 14.99 (H) 1.60 - 5.50 x10*3/uL    Immature Granulocytes Absolute, Automated 0.05 0.00 - 0.50 x10*3/uL    Lymphocytes Absolute 1.11 0.80 - 3.00 x10*3/uL    Monocytes Absolute 0.50 0.05 - 0.80 x10*3/uL    Eosinophils Absolute 0.09 0.00 - 0.40 x10*3/uL    Basophils Absolute 0.06 0.00 - 0.10 x10*3/uL   Comprehensive Metabolic Panel   Result Value Ref Range    Glucose 197 (H) 74 - 99 mg/dL    Sodium 131 (L) 136 - 145 mmol/L    Potassium 4.0 3.5 - 5.3 mmol/L    Chloride 96  (L) 98 - 107 mmol/L    Bicarbonate 27 21 - 32 mmol/L    Anion Gap 12 10 - 20 mmol/L    Urea Nitrogen 19 6 - 23 mg/dL    Creatinine 0.73 0.50 - 1.05 mg/dL    eGFR 82 >60 mL/min/1.73m*2    Calcium 9.2 8.6 - 10.3 mg/dL    Albumin 4.3 3.4 - 5.0 g/dL    Alkaline Phosphatase 49 33 - 136 U/L    Total Protein 6.9 6.4 - 8.2 g/dL    AST 27 9 - 39 U/L    Bilirubin, Total 0.5 0.0 - 1.2 mg/dL    ALT 19 7 - 45 U/L   aPTT   Result Value Ref Range    aPTT 28 27 - 38 seconds   Protime-INR   Result Value Ref Range    Protime 11.6 9.8 - 12.8 seconds    INR 1.0 0.9 - 1.1   Troponin I, High Sensitivity, Initial   Result Value Ref Range    Troponin I, High Sensitivity 156 (HH) 0 - 13 ng/L   B-type natriuretic peptide   Result Value Ref Range     (H) 0 - 99 pg/mL   Magnesium   Result Value Ref Range    Magnesium 2.02 1.60 - 2.40 mg/dL   ECG 12 lead   Result Value Ref Range    Ventricular Rate 64 BPM    Atrial Rate 64 BPM    LA Interval 322 ms    QRS Duration 130 ms    QT Interval 448 ms    QTC Calculation(Bazett) 462 ms    P Axis 49 degrees    R Axis -75 degrees    T Axis 94 degrees    QRS Count 10 beats    Q Onset 205 ms    P Onset 44 ms    P Offset 99 ms    T Offset 429 ms    QTC Fredericia 457 ms   Troponin, High Sensitivity, 1 Hour   Result Value Ref Range    Troponin I, High Sensitivity 160 (HH) 0 - 13 ng/L   Type and Screen   Result Value Ref Range    ABO TYPE B     Rh TYPE POS     ANTIBODY SCREEN NEG    Urinalysis with Reflex Culture and Microscopic   Result Value Ref Range    Color, Urine Yellow Straw, Yellow    Appearance, Urine Clear Clear    Specific Gravity, Urine 1.015 1.005 - 1.035    pH, Urine 6.0 5.0, 5.5, 6.0, 6.5, 7.0, 7.5, 8.0    Protein, Urine NEGATIVE NEGATIVE mg/dL    Glucose, Urine NEGATIVE NEGATIVE mg/dL    Blood, Urine NEGATIVE NEGATIVE    Ketones, Urine NEGATIVE NEGATIVE mg/dL    Bilirubin, Urine NEGATIVE NEGATIVE    Urobilinogen, Urine <2.0 <2.0 mg/dL    Nitrite, Urine NEGATIVE NEGATIVE    Leukocyte  Esterase, Urine NEGATIVE NEGATIVE     CT cervical spine wo IV contrast    Result Date: 2/27/2024  Interpreted By:  Angélica Jansen, STUDY: CT CERVICAL SPINE WO IV CONTRAST;  2/27/2024 2:09 pm   INDICATION: Signs/Symptoms:fall.   COMPARISON: None.   ACCESSION NUMBER(S): [id number]   ORDERING CLINICIAN: RAZ ZURITA   TECHNIQUE: Thin section axial images were obtained from the skull base down through the thoracic inlet. Sagittal and coronal reconstruction images were generated. Soft tissue, lung, and bone windows were reviewed.   FINDINGS: VERTEBRAL BODIES AND POSTERIOR ELEMENTS: No cervical spine compression fracture.  No posterior element fracture.  No destructive bone lesion. No traumatic listhesis.   SPINAL CANAL/NEURAL FORAMINA: Multilevel spondylosis, most pronounced at C6-C7 with moderate to severe spinal canal stenosis and severe bilateral neural foraminal narrowing due to prominent posterior disc osteophyte complex.   NECK SOFT TISSUES: No acute abnormalities.   LUNG APICES: Clear.   SKULL BASE: No acute abnormalities.       No acute fracture or traumatic malalignment.   Multilevel spondylosis, most pronounced at C6-C7 with moderate to severe spinal canal stenosis and severe bilateral neural foraminal narrowing.   Signed by: Angélica Jansen 2/27/2024 2:40 PM Dictation workstation:   [id number]    ECG 12 lead    Result Date: 2/27/2024  Sinus rhythm with 1st degree AV block Possible Left atrial enlargement Left axis deviation Right bundle branch block Anteroseptal infarct , age undetermined Abnormal ECG No previous ECGs available    CT head wo IV contrast    Result Date: 2/27/2024  Interpreted By:  Angélica Jansen, STUDY: CT HEAD WO IV CONTRAST;  2/27/2024 2:09 pm   INDICATION: Signs/Symptoms:fall.   COMPARISON: None.   ACCESSION NUMBER(S): [id number]   ORDERING CLINICIAN: ARZ ZURITA   TECHNIQUE: Noncontrast axial CT scan of head was performed.   FINDINGS: Parenchyma: There is no intracranial  hemorrhage. The grey-white differentiation is intact. There is no mass effect or midline shift. Patchy periventricular white matter hypodensities, likely moderate chronic microvascular ischemic change.   CSF Spaces: The ventricles, sulci and basal cisterns are within normal limits for age.   Extra-Axial Fluid: There is no extraaxial fluid collection.   Calvarium: No acute fracture.   Paranasal sinuses: Visualized paranasal sinuses are clear.   Mastoids: Clear.   Orbits: Normal.   Soft tissues: Unremarkable.       No acute intracranial abnormality or calvarial fracture.   MACRO None   Signed by: Angélica Jansen 2/27/2024 2:36 PM Dictation workstation:   [id number]    XR hip left with pelvis when performed 2 or 3 views    Result Date: 2/27/2024  Interpreted By:  Qamar Marcum, STUDY: XR HIP LEFT WITH PELVIS WHEN PERFORMED 2 OR 3 VIEWS;  2/27/2024 12:52 pm   INDICATION: Signs/Symptoms:fall.   COMPARISON: None.   ACCESSION NUMBER(S): [id number]   ORDERING CLINICIAN: RAZ ZURITA   TECHNIQUE: AP view pelvis and 2 views  of the  left hip were obtained.   FINDINGS: Osteopenic bones. Multilevel mid to distal lumbar spine disc space narrowing with endplate osteophytosis. Sclerotic arthritic changes in both SI joints. Mild bilateral hip joint space narrowing. Prominent aortoiliac and femoral artery calcifications. Flocculent rounded central pelvic calcification measuring 19 mm in diameter, most likely a calcified uterine fibroid. No lytic or blastic destructive bone lesion. There is an acute comminuted displaced intertrochanteric fracture of the proximal left femur. There is mild superior displacement of the femoral fragment relative to the neck fragment, with mild impaction of fragments. Also medial displacement of the lesser trochanteric fragments and posterior displacement of the greater trochanteric fragments. No femoral head involvement. No dislocation. No opaque soft tissue foreign body. No periosteal reaction or  erosion.       Acute comminuted impacted and angulated fracture through the intertrochanteric proximal left femur as described.   MACRO: None   Signed by: Qamar Marcum 2/27/2024 1:14 PM Dictation workstation:   [id number]    XR chest 1 view    Result Date: 2/27/2024  Interpreted By:  Qamar Marcum, STUDY: XR CHEST 1 VIEW;  2/27/2024 12:52 pm   INDICATION: Signs/Symptoms:Chest Pain.   COMPARISON: None.   ACCESSION NUMBER(S): [id number]   ORDERING CLINICIAN: RAZ ZURITA   TECHNIQUE: Single AP portable view of the chest was obtained.   FINDINGS: MEDIASTINUM/ LUNGS/ [place]: Suboptimal level of inspiration. Elevation of the right diaphragm. Cardiomegaly without vascular congestion or pleural effusion. The No abnormal opacity in either lung worrisome for tumor or pneumonia. No pneumothorax. No tracheal deviation. No abnormal hilar fullness or gross mass on either side.   BONES: No lytic or blastic destructive bone lesion.   UPPER ABDOMEN: Grossly intact.       Hypoventilatory exam. Elevation of the right diaphragm.   Mild cardiomegaly.  Currently without radiographic evidence of CHF or pneumonia.   MACRO: None   Signed by: Qamar Marcum 2/27/2024 1:13 PM Dictation workstation:   [id number]      Assessment/Plan     This is a very pleasant 83-year-old lady with a known history of osteopenia, SLE, hypothyroidism, hypertension, hyperlipidemia, cervical DDD, who is presenting with a closed displaced intertrochanteric fracture of the left femur after fall; ED course complicated by hypertensive urgency with mild troponin elevation, although patient declines any shortness of breath or chest discomfort, will reach out to cardiology to see if she could use further workup prior to surgerical intervention although will keep NPO as she may be an appropriate candidate without further testing; patient has an RCRI of 0 points indicating a class I risk or otherwise 3.9% 30-day risk of death MI or cardiac arrest is related to the  general anesthesia and her ability to facilitate the surgical correction of her hip fracture, indicating an acceptable risk compared to benefit for this urgent procedure, this was discussed in detail with the patient and her daughter who is at the bedside at the time of our interview and examination in the emergency department; will give gentle mIVF for hyponatremia and trend her routine labs in the AM.    Principal Problem:    Closed displaced intertrochanteric fracture of left femur, initial encounter (CMS/[facility]ly [facility])  Active Problems:    Hypertension    Hyperlipidemia    Osteopenia    SLE (systemic lupus erythematosus related syndrome) (CMS/[facility]ly [facility])    DDD (degenerative disc disease), cervical    Lichen planus    Hypothyroidism    Scoliosis    Plan:  - Patient stable for orthopedic floor, continue telemetry  - Will reach out to cardiology for assistance and clearance, although she seems to be an appropriate candidate/medically optimized to undergo surgery, will leave n.p.o. at midnight  - Home medication reconciliation has been completed otherwise  - Discussed the plan of care with patient and her daughter at the bedside in detail  - Acetaminophen, oxycodone and hydromorphone as needed    Diet: Regular, n.p.o. at midnight  VTE prophylaxis: Subcu heparin until midnight, then hold for surgery  Code: Full, confirmed with patient and daughter and emergency department at the time of admission    Heron Santana MD

## 2024-02-28 ENCOUNTER — APPOINTMENT (OUTPATIENT)
Dept: CARDIOLOGY | Facility: HOSPITAL | Age: 84
DRG: 481 | End: 2024-02-28
Payer: MEDICARE

## 2024-02-28 ENCOUNTER — APPOINTMENT (OUTPATIENT)
Dept: RADIOLOGY | Facility: HOSPITAL | Age: 84
DRG: 481 | End: 2024-02-28
Payer: MEDICARE

## 2024-02-28 ENCOUNTER — ANESTHESIA EVENT (OUTPATIENT)
Dept: OPERATING ROOM | Facility: HOSPITAL | Age: 84
DRG: 481 | End: 2024-02-28
Payer: MEDICARE

## 2024-02-28 ENCOUNTER — ANESTHESIA (OUTPATIENT)
Dept: OPERATING ROOM | Facility: HOSPITAL | Age: 84
DRG: 481 | End: 2024-02-28
Payer: MEDICARE

## 2024-02-28 LAB
ANION GAP SERPL CALC-SCNC: 12 MMOL/L (ref 10–20)
ANION GAP SERPL CALC-SCNC: 12 MMOL/L (ref 10–20)
AORTIC VALVE PEAK VELOCITY: 1.22 M/S
ATRIAL RATE: 65 BPM
AV PEAK GRADIENT: 5.9 MMHG
AVA (PEAK VEL): 2.76 CM2
BUN SERPL-MCNC: 14 MG/DL (ref 6–23)
BUN SERPL-MCNC: 14 MG/DL (ref 6–23)
CALCIUM SERPL-MCNC: 8.3 MG/DL (ref 8.6–10.3)
CALCIUM SERPL-MCNC: 8.3 MG/DL (ref 8.6–10.3)
CARDIAC TROPONIN I PNL SERPL HS: 252 NG/L (ref 0–13)
CHLORIDE SERPL-SCNC: 98 MMOL/L (ref 98–107)
CHLORIDE SERPL-SCNC: 99 MMOL/L (ref 98–107)
CHOLEST SERPL-MCNC: 136 MG/DL (ref 0–199)
CHOLESTEROL/HDL RATIO: 2.6
CO2 SERPL-SCNC: 23 MMOL/L (ref 21–32)
CO2 SERPL-SCNC: 24 MMOL/L (ref 21–32)
CREAT SERPL-MCNC: 0.45 MG/DL (ref 0.5–1.05)
CREAT SERPL-MCNC: 0.51 MG/DL (ref 0.5–1.05)
EGFRCR SERPLBLD CKD-EPI 2021: >90 ML/MIN/1.73M*2
EGFRCR SERPLBLD CKD-EPI 2021: >90 ML/MIN/1.73M*2
EJECTION FRACTION APICAL 4 CHAMBER: 48.5
EJECTION FRACTION: 48 %
ERYTHROCYTE [DISTWIDTH] IN BLOOD BY AUTOMATED COUNT: 13 % (ref 11.5–14.5)
GLUCOSE SERPL-MCNC: 101 MG/DL (ref 74–99)
GLUCOSE SERPL-MCNC: 111 MG/DL (ref 74–99)
HCT VFR BLD AUTO: 34.8 % (ref 36–46)
HDLC SERPL-MCNC: 53.1 MG/DL
HGB BLD-MCNC: 11.5 G/DL (ref 12–16)
HOLD SPECIMEN: NORMAL
INR PPP: 1.1 (ref 0.9–1.1)
LDLC SERPL CALC-MCNC: 64 MG/DL
LEFT ATRIUM VOLUME AREA LENGTH INDEX BSA: 25.5 ML/M2
LEFT VENTRICLE INTERNAL DIMENSION DIASTOLE: 4.35 CM (ref 3.5–6)
LEFT VENTRICULAR OUTFLOW TRACT DIAMETER: 2.29 CM
MAGNESIUM SERPL-MCNC: 1.71 MG/DL (ref 1.6–2.4)
MCH RBC QN AUTO: 29.2 PG (ref 26–34)
MCHC RBC AUTO-ENTMCNC: 33 G/DL (ref 32–36)
MCV RBC AUTO: 88 FL (ref 80–100)
MITRAL VALVE E/A RATIO: 0.64
MITRAL VALVE E/E' RATIO: 5.2
NON HDL CHOLESTEROL: 83 MG/DL (ref 0–149)
NRBC BLD-RTO: 0 /100 WBCS (ref 0–0)
P OFFSET: 114 MS
P ONSET: 65 MS
PLATELET # BLD AUTO: 185 X10*3/UL (ref 150–450)
POTASSIUM SERPL-SCNC: 3.7 MMOL/L (ref 3.5–5.3)
POTASSIUM SERPL-SCNC: 3.8 MMOL/L (ref 3.5–5.3)
PR INTERVAL: 282 MS
PROTHROMBIN TIME: 12.3 SECONDS (ref 9.8–12.8)
Q ONSET: 206 MS
QRS COUNT: 11 BEATS
QRS DURATION: 132 MS
QT INTERVAL: 446 MS
QTC CALCULATION(BAZETT): 463 MS
QTC FREDERICIA: 457 MS
R AXIS: 246 DEGREES
RBC # BLD AUTO: 3.94 X10*6/UL (ref 4–5.2)
SODIUM SERPL-SCNC: 130 MMOL/L (ref 136–145)
SODIUM SERPL-SCNC: 130 MMOL/L (ref 136–145)
T AXIS: 77 DEGREES
T OFFSET: 429 MS
T4 FREE SERPL-MCNC: 1.31 NG/DL (ref 0.61–1.12)
TRICUSPID ANNULAR PLANE SYSTOLIC EXCURSION: 1.7 CM
TRIGL SERPL-MCNC: 97 MG/DL (ref 0–149)
TSH SERPL-ACNC: 0.25 MIU/L (ref 0.44–3.98)
VENTRICULAR RATE: 65 BPM
VLDL: 19 MG/DL (ref 0–40)
WBC # BLD AUTO: 8.9 X10*3/UL (ref 4.4–11.3)

## 2024-02-28 PROCEDURE — 84439 ASSAY OF FREE THYROXINE: CPT | Performed by: SPECIALIST

## 2024-02-28 PROCEDURE — 73700 CT LOWER EXTREMITY W/O DYE: CPT | Mod: LT

## 2024-02-28 PROCEDURE — 1200000002 HC GENERAL ROOM WITH TELEMETRY DAILY

## 2024-02-28 PROCEDURE — 27245 TREAT THIGH FRACTURE: CPT | Performed by: ORTHOPAEDIC SURGERY

## 2024-02-28 PROCEDURE — A27245 PR OPEN FIX INTER/SUBTROCH FX,IMPLNT: Performed by: ANESTHESIOLOGIST ASSISTANT

## 2024-02-28 PROCEDURE — 2500000001 HC RX 250 WO HCPCS SELF ADMINISTERED DRUGS (ALT 637 FOR MEDICARE OP): Performed by: INTERNAL MEDICINE

## 2024-02-28 PROCEDURE — 7100000001 HC RECOVERY ROOM TIME - INITIAL BASE CHARGE: Performed by: ORTHOPAEDIC SURGERY

## 2024-02-28 PROCEDURE — 93306 TTE W/DOPPLER COMPLETE: CPT

## 2024-02-28 PROCEDURE — 93005 ELECTROCARDIOGRAM TRACING: CPT

## 2024-02-28 PROCEDURE — 85027 COMPLETE CBC AUTOMATED: CPT | Performed by: INTERNAL MEDICINE

## 2024-02-28 PROCEDURE — 2500000002 HC RX 250 W HCPCS SELF ADMINISTERED DRUGS (ALT 637 FOR MEDICARE OP, ALT 636 FOR OP/ED): Performed by: INTERNAL MEDICINE

## 2024-02-28 PROCEDURE — 27245 TREAT THIGH FRACTURE: CPT | Performed by: PHYSICIAN ASSISTANT

## 2024-02-28 PROCEDURE — 84443 ASSAY THYROID STIM HORMONE: CPT | Performed by: SPECIALIST

## 2024-02-28 PROCEDURE — 2780000003 HC OR 278 NO HCPCS: Performed by: ORTHOPAEDIC SURGERY

## 2024-02-28 PROCEDURE — 80048 BASIC METABOLIC PNL TOTAL CA: CPT | Performed by: INTERNAL MEDICINE

## 2024-02-28 PROCEDURE — C1769 GUIDE WIRE: HCPCS | Performed by: ORTHOPAEDIC SURGERY

## 2024-02-28 PROCEDURE — 3700000002 HC GENERAL ANESTHESIA TIME - EACH INCREMENTAL 1 MINUTE: Performed by: ORTHOPAEDIC SURGERY

## 2024-02-28 PROCEDURE — 3700000001 HC GENERAL ANESTHESIA TIME - INITIAL BASE CHARGE: Performed by: ORTHOPAEDIC SURGERY

## 2024-02-28 PROCEDURE — 2500000004 HC RX 250 GENERAL PHARMACY W/ HCPCS (ALT 636 FOR OP/ED): Performed by: STUDENT IN AN ORGANIZED HEALTH CARE EDUCATION/TRAINING PROGRAM

## 2024-02-28 PROCEDURE — 99223 1ST HOSP IP/OBS HIGH 75: CPT | Performed by: ORTHOPAEDIC SURGERY

## 2024-02-28 PROCEDURE — 36415 COLL VENOUS BLD VENIPUNCTURE: CPT | Performed by: INTERNAL MEDICINE

## 2024-02-28 PROCEDURE — 99232 SBSQ HOSP IP/OBS MODERATE 35: CPT | Performed by: INTERNAL MEDICINE

## 2024-02-28 PROCEDURE — C1713 ANCHOR/SCREW BN/BN,TIS/BN: HCPCS | Performed by: ORTHOPAEDIC SURGERY

## 2024-02-28 PROCEDURE — 2500000004 HC RX 250 GENERAL PHARMACY W/ HCPCS (ALT 636 FOR OP/ED): Performed by: ANESTHESIOLOGIST ASSISTANT

## 2024-02-28 PROCEDURE — 80061 LIPID PANEL: CPT | Performed by: SPECIALIST

## 2024-02-28 PROCEDURE — 2500000005 HC RX 250 GENERAL PHARMACY W/O HCPCS: Performed by: ANESTHESIOLOGIST ASSISTANT

## 2024-02-28 PROCEDURE — 85610 PROTHROMBIN TIME: CPT | Performed by: INTERNAL MEDICINE

## 2024-02-28 PROCEDURE — 3600000004 HC OR TIME - INITIAL BASE CHARGE - PROCEDURE LEVEL FOUR: Performed by: ORTHOPAEDIC SURGERY

## 2024-02-28 PROCEDURE — 7100000002 HC RECOVERY ROOM TIME - EACH INCREMENTAL 1 MINUTE: Performed by: ORTHOPAEDIC SURGERY

## 2024-02-28 PROCEDURE — 83735 ASSAY OF MAGNESIUM: CPT | Performed by: INTERNAL MEDICINE

## 2024-02-28 PROCEDURE — 2500000004 HC RX 250 GENERAL PHARMACY W/ HCPCS (ALT 636 FOR OP/ED): Performed by: INTERNAL MEDICINE

## 2024-02-28 PROCEDURE — A27245 PR OPEN FIX INTER/SUBTROCH FX,IMPLNT: Performed by: ANESTHESIOLOGY

## 2024-02-28 PROCEDURE — 84484 ASSAY OF TROPONIN QUANT: CPT | Performed by: SPECIALIST

## 2024-02-28 PROCEDURE — 2720000007 HC OR 272 NO HCPCS: Performed by: ORTHOPAEDIC SURGERY

## 2024-02-28 PROCEDURE — 3600000009 HC OR TIME - EACH INCREMENTAL 1 MINUTE - PROCEDURE LEVEL FOUR: Performed by: ORTHOPAEDIC SURGERY

## 2024-02-28 PROCEDURE — 99100 ANES PT EXTEME AGE<1 YR&>70: CPT | Performed by: ANESTHESIOLOGY

## 2024-02-28 PROCEDURE — 2500000004 HC RX 250 GENERAL PHARMACY W/ HCPCS (ALT 636 FOR OP/ED): Performed by: PHYSICIAN ASSISTANT

## 2024-02-28 PROCEDURE — 0QS736Z REPOSITION LEFT UPPER FEMUR WITH INTRAMEDULLARY INTERNAL FIXATION DEVICE, PERCUTANEOUS APPROACH: ICD-10-PCS | Performed by: ORTHOPAEDIC SURGERY

## 2024-02-28 DEVICE — SCREW, LOCKING 5.0MM X 32MM TI STERILE: Type: IMPLANTABLE DEVICE | Site: HIP | Status: FUNCTIONAL

## 2024-02-28 DEVICE — 10MM/130 DEG TI CANN TFNA 170MM - STERILE
Type: IMPLANTABLE DEVICE | Site: HIP | Status: FUNCTIONAL
Brand: TFN-ADVANCE

## 2024-02-28 DEVICE — SCREW, TFNA, 100MM, STERILE: Type: IMPLANTABLE DEVICE | Site: HIP | Status: FUNCTIONAL

## 2024-02-28 RX ORDER — ONDANSETRON HYDROCHLORIDE 2 MG/ML
INJECTION, SOLUTION INTRAVENOUS AS NEEDED
Status: DISCONTINUED | OUTPATIENT
Start: 2024-02-28 | End: 2024-02-28

## 2024-02-28 RX ORDER — LIDOCAINE HYDROCHLORIDE 10 MG/ML
0.1 INJECTION, SOLUTION EPIDURAL; INFILTRATION; INTRACAUDAL; PERINEURAL ONCE
Status: DISCONTINUED | OUTPATIENT
Start: 2024-02-28 | End: 2024-02-28 | Stop reason: HOSPADM

## 2024-02-28 RX ORDER — SODIUM CHLORIDE, SODIUM LACTATE, POTASSIUM CHLORIDE, CALCIUM CHLORIDE 600; 310; 30; 20 MG/100ML; MG/100ML; MG/100ML; MG/100ML
INJECTION, SOLUTION INTRAVENOUS CONTINUOUS PRN
Status: DISCONTINUED | OUTPATIENT
Start: 2024-02-28 | End: 2024-02-28

## 2024-02-28 RX ORDER — ALBUTEROL SULFATE 0.83 MG/ML
2.5 SOLUTION RESPIRATORY (INHALATION) ONCE AS NEEDED
Status: DISCONTINUED | OUTPATIENT
Start: 2024-02-28 | End: 2024-02-28 | Stop reason: HOSPADM

## 2024-02-28 RX ORDER — GLYCOPYRROLATE 0.2 MG/ML
INJECTION INTRAMUSCULAR; INTRAVENOUS AS NEEDED
Status: DISCONTINUED | OUTPATIENT
Start: 2024-02-28 | End: 2024-02-28

## 2024-02-28 RX ORDER — OXYCODONE HYDROCHLORIDE 10 MG/1
10 TABLET ORAL EVERY 4 HOURS PRN
Status: DISCONTINUED | OUTPATIENT
Start: 2024-02-28 | End: 2024-03-01 | Stop reason: HOSPADM

## 2024-02-28 RX ORDER — NALOXONE HYDROCHLORIDE 0.4 MG/ML
0.2 INJECTION, SOLUTION INTRAMUSCULAR; INTRAVENOUS; SUBCUTANEOUS EVERY 5 MIN PRN
Status: DISCONTINUED | OUTPATIENT
Start: 2024-02-28 | End: 2024-03-01 | Stop reason: HOSPADM

## 2024-02-28 RX ORDER — HYDROMORPHONE HYDROCHLORIDE 1 MG/ML
0.2 INJECTION, SOLUTION INTRAMUSCULAR; INTRAVENOUS; SUBCUTANEOUS EVERY 5 MIN PRN
Status: DISCONTINUED | OUTPATIENT
Start: 2024-02-28 | End: 2024-02-28 | Stop reason: HOSPADM

## 2024-02-28 RX ORDER — ONDANSETRON HYDROCHLORIDE 2 MG/ML
4 INJECTION, SOLUTION INTRAVENOUS ONCE AS NEEDED
Status: COMPLETED | OUTPATIENT
Start: 2024-02-28 | End: 2024-02-28

## 2024-02-28 RX ORDER — MEPERIDINE HYDROCHLORIDE 50 MG/ML
12.5 INJECTION INTRAMUSCULAR; INTRAVENOUS; SUBCUTANEOUS EVERY 10 MIN PRN
Status: DISCONTINUED | OUTPATIENT
Start: 2024-02-28 | End: 2024-02-28 | Stop reason: HOSPADM

## 2024-02-28 RX ORDER — LIDOCAINE HYDROCHLORIDE 20 MG/ML
INJECTION, SOLUTION INFILTRATION; PERINEURAL AS NEEDED
Status: DISCONTINUED | OUTPATIENT
Start: 2024-02-28 | End: 2024-02-28

## 2024-02-28 RX ORDER — SODIUM CHLORIDE, SODIUM LACTATE, POTASSIUM CHLORIDE, CALCIUM CHLORIDE 600; 310; 30; 20 MG/100ML; MG/100ML; MG/100ML; MG/100ML
100 INJECTION, SOLUTION INTRAVENOUS CONTINUOUS
Status: DISCONTINUED | OUTPATIENT
Start: 2024-02-28 | End: 2024-02-28 | Stop reason: HOSPADM

## 2024-02-28 RX ORDER — OXYCODONE HYDROCHLORIDE 5 MG/1
5 TABLET ORAL EVERY 4 HOURS PRN
Status: DISCONTINUED | OUTPATIENT
Start: 2024-02-28 | End: 2024-02-28 | Stop reason: HOSPADM

## 2024-02-28 RX ORDER — SUCCINYLCHOLINE/SOD CL,ISO/PF 100 MG/5ML
SYRINGE (ML) INTRAVENOUS AS NEEDED
Status: DISCONTINUED | OUTPATIENT
Start: 2024-02-28 | End: 2024-02-28

## 2024-02-28 RX ORDER — OXYCODONE HYDROCHLORIDE 10 MG/1
10 TABLET ORAL EVERY 4 HOURS PRN
Status: DISCONTINUED | OUTPATIENT
Start: 2024-02-28 | End: 2024-02-28 | Stop reason: HOSPADM

## 2024-02-28 RX ORDER — LABETALOL HYDROCHLORIDE 5 MG/ML
5 INJECTION, SOLUTION INTRAVENOUS ONCE AS NEEDED
Status: DISCONTINUED | OUTPATIENT
Start: 2024-02-28 | End: 2024-02-28 | Stop reason: HOSPADM

## 2024-02-28 RX ORDER — DIPHENHYDRAMINE HYDROCHLORIDE 50 MG/ML
12.5 INJECTION INTRAMUSCULAR; INTRAVENOUS ONCE AS NEEDED
Status: DISCONTINUED | OUTPATIENT
Start: 2024-02-28 | End: 2024-02-28 | Stop reason: HOSPADM

## 2024-02-28 RX ORDER — ACETAMINOPHEN 325 MG/1
975 TABLET ORAL ONCE
Status: DISCONTINUED | OUTPATIENT
Start: 2024-02-28 | End: 2024-02-28 | Stop reason: HOSPADM

## 2024-02-28 RX ORDER — PROPOFOL 10 MG/ML
INJECTION, EMULSION INTRAVENOUS AS NEEDED
Status: DISCONTINUED | OUTPATIENT
Start: 2024-02-28 | End: 2024-02-28

## 2024-02-28 RX ORDER — METOCLOPRAMIDE HYDROCHLORIDE 5 MG/ML
10 INJECTION INTRAMUSCULAR; INTRAVENOUS ONCE AS NEEDED
Status: DISCONTINUED | OUTPATIENT
Start: 2024-02-28 | End: 2024-02-28 | Stop reason: HOSPADM

## 2024-02-28 RX ORDER — ROCURONIUM BROMIDE 10 MG/ML
INJECTION, SOLUTION INTRAVENOUS AS NEEDED
Status: DISCONTINUED | OUTPATIENT
Start: 2024-02-28 | End: 2024-02-28

## 2024-02-28 RX ORDER — FENTANYL CITRATE 50 UG/ML
25 INJECTION, SOLUTION INTRAMUSCULAR; INTRAVENOUS EVERY 5 MIN PRN
Status: DISCONTINUED | OUTPATIENT
Start: 2024-02-28 | End: 2024-02-28 | Stop reason: HOSPADM

## 2024-02-28 RX ORDER — CEFAZOLIN SODIUM 2 G/100ML
INJECTION, SOLUTION INTRAVENOUS AS NEEDED
Status: DISCONTINUED | OUTPATIENT
Start: 2024-02-28 | End: 2024-02-28

## 2024-02-28 RX ORDER — ONDANSETRON HYDROCHLORIDE 2 MG/ML
4 INJECTION, SOLUTION INTRAVENOUS EVERY 8 HOURS PRN
Status: DISCONTINUED | OUTPATIENT
Start: 2024-02-28 | End: 2024-03-01 | Stop reason: HOSPADM

## 2024-02-28 RX ORDER — CEFAZOLIN SODIUM 2 G/100ML
2 INJECTION, SOLUTION INTRAVENOUS EVERY 8 HOURS
Status: COMPLETED | OUTPATIENT
Start: 2024-02-28 | End: 2024-02-29

## 2024-02-28 RX ORDER — DEXAMETHASONE SODIUM PHOSPHATE 4 MG/ML
INJECTION, SOLUTION INTRA-ARTICULAR; INTRALESIONAL; INTRAMUSCULAR; INTRAVENOUS; SOFT TISSUE AS NEEDED
Status: DISCONTINUED | OUTPATIENT
Start: 2024-02-28 | End: 2024-02-28

## 2024-02-28 RX ORDER — OXYCODONE HYDROCHLORIDE 5 MG/1
5 TABLET ORAL EVERY 6 HOURS PRN
Status: DISCONTINUED | OUTPATIENT
Start: 2024-02-28 | End: 2024-03-01 | Stop reason: HOSPADM

## 2024-02-28 RX ORDER — FENTANYL CITRATE 50 UG/ML
INJECTION, SOLUTION INTRAMUSCULAR; INTRAVENOUS AS NEEDED
Status: DISCONTINUED | OUTPATIENT
Start: 2024-02-28 | End: 2024-02-28

## 2024-02-28 RX ORDER — NEOSTIGMINE METHYLSULFATE 1 MG/ML
INJECTION, SOLUTION INTRAVENOUS AS NEEDED
Status: DISCONTINUED | OUTPATIENT
Start: 2024-02-28 | End: 2024-02-28

## 2024-02-28 RX ORDER — ACETAMINOPHEN 325 MG/1
650 TABLET ORAL EVERY 6 HOURS SCHEDULED
Status: DISCONTINUED | OUTPATIENT
Start: 2024-02-28 | End: 2024-03-01 | Stop reason: HOSPADM

## 2024-02-28 RX ORDER — PHENYLEPHRINE HCL IN 0.9% NACL 1 MG/10 ML
SYRINGE (ML) INTRAVENOUS AS NEEDED
Status: DISCONTINUED | OUTPATIENT
Start: 2024-02-28 | End: 2024-02-28

## 2024-02-28 RX ORDER — DIPHENHYDRAMINE HCL 12.5MG/5ML
12.5 LIQUID (ML) ORAL EVERY 6 HOURS PRN
Status: DISCONTINUED | OUTPATIENT
Start: 2024-02-28 | End: 2024-03-01 | Stop reason: HOSPADM

## 2024-02-28 RX ORDER — BISACODYL 5 MG
10 TABLET, DELAYED RELEASE (ENTERIC COATED) ORAL DAILY PRN
Status: DISCONTINUED | OUTPATIENT
Start: 2024-02-28 | End: 2024-03-01 | Stop reason: HOSPADM

## 2024-02-28 RX ORDER — ONDANSETRON HYDROCHLORIDE 2 MG/ML
4 INJECTION, SOLUTION INTRAVENOUS ONCE AS NEEDED
Status: DISCONTINUED | OUTPATIENT
Start: 2024-02-28 | End: 2024-02-28 | Stop reason: HOSPADM

## 2024-02-28 RX ORDER — ONDANSETRON 4 MG/1
4 TABLET, ORALLY DISINTEGRATING ORAL EVERY 8 HOURS PRN
Status: DISCONTINUED | OUTPATIENT
Start: 2024-02-28 | End: 2024-03-01 | Stop reason: HOSPADM

## 2024-02-28 RX ORDER — HYDROMORPHONE HYDROCHLORIDE 1 MG/ML
0.5 INJECTION, SOLUTION INTRAMUSCULAR; INTRAVENOUS; SUBCUTANEOUS EVERY 5 MIN PRN
Status: DISCONTINUED | OUTPATIENT
Start: 2024-02-28 | End: 2024-02-28 | Stop reason: HOSPADM

## 2024-02-28 RX ORDER — HYDRALAZINE HYDROCHLORIDE 20 MG/ML
5 INJECTION INTRAMUSCULAR; INTRAVENOUS EVERY 30 MIN PRN
Status: DISCONTINUED | OUTPATIENT
Start: 2024-02-28 | End: 2024-02-28 | Stop reason: HOSPADM

## 2024-02-28 RX ORDER — FAMOTIDINE 10 MG/ML
20 INJECTION INTRAVENOUS ONCE
Status: DISCONTINUED | OUTPATIENT
Start: 2024-02-28 | End: 2024-02-28 | Stop reason: HOSPADM

## 2024-02-28 RX ORDER — ENOXAPARIN SODIUM 100 MG/ML
40 INJECTION SUBCUTANEOUS DAILY
Status: DISCONTINUED | OUTPATIENT
Start: 2024-02-28 | End: 2024-03-01 | Stop reason: HOSPADM

## 2024-02-28 RX ADMIN — ONDANSETRON 4 MG: 2 INJECTION INTRAMUSCULAR; INTRAVENOUS at 15:07

## 2024-02-28 RX ADMIN — SIMVASTATIN 10 MG: 10 TABLET, FILM COATED ORAL at 20:55

## 2024-02-28 RX ADMIN — SODIUM CHLORIDE, POTASSIUM CHLORIDE, SODIUM LACTATE AND CALCIUM CHLORIDE: 600; 310; 30; 20 INJECTION, SOLUTION INTRAVENOUS at 12:13

## 2024-02-28 RX ADMIN — FENTANYL CITRATE 50 MCG: 50 INJECTION, SOLUTION INTRAMUSCULAR; INTRAVENOUS at 13:47

## 2024-02-28 RX ADMIN — HYDROMORPHONE HYDROCHLORIDE 0.4 MG: 1 INJECTION, SOLUTION INTRAMUSCULAR; INTRAVENOUS; SUBCUTANEOUS at 07:39

## 2024-02-28 RX ADMIN — FENTANYL CITRATE 25 MCG: 50 INJECTION, SOLUTION INTRAMUSCULAR; INTRAVENOUS at 14:21

## 2024-02-28 RX ADMIN — PROPOFOL 150 MG: 10 INJECTION, EMULSION INTRAVENOUS at 13:47

## 2024-02-28 RX ADMIN — Medication 200 MCG: at 13:53

## 2024-02-28 RX ADMIN — LIDOCAINE HYDROCHLORIDE 80 MG: 20 INJECTION, SOLUTION INFILTRATION; PERINEURAL at 13:47

## 2024-02-28 RX ADMIN — ROCURONIUM BROMIDE 10 MG: 10 INJECTION INTRAVENOUS at 13:47

## 2024-02-28 RX ADMIN — LATANOPROST 1 DROP: 50 SOLUTION OPHTHALMIC at 20:55

## 2024-02-28 RX ADMIN — DEXAMETHASONE SODIUM PHOSPHATE 4 MG: 4 INJECTION, SOLUTION INTRAMUSCULAR; INTRAVENOUS at 14:15

## 2024-02-28 RX ADMIN — ENOXAPARIN SODIUM 40 MG: 40 INJECTION SUBCUTANEOUS at 20:55

## 2024-02-28 RX ADMIN — GLYCOPYRROLATE 0.6 MG: 0.2 INJECTION, SOLUTION INTRAMUSCULAR; INTRAVENOUS at 14:47

## 2024-02-28 RX ADMIN — CEFAZOLIN SODIUM 2 G: 2 INJECTION, SOLUTION INTRAVENOUS at 13:58

## 2024-02-28 RX ADMIN — PROPOFOL 50 MG: 10 INJECTION, EMULSION INTRAVENOUS at 14:15

## 2024-02-28 RX ADMIN — ROCURONIUM BROMIDE 10 MG: 10 INJECTION INTRAVENOUS at 14:14

## 2024-02-28 RX ADMIN — BENZOCAINE AND MENTHOL 1 LOZENGE: 15; 3.6 LOZENGE ORAL at 21:15

## 2024-02-28 RX ADMIN — CEFAZOLIN SODIUM 2 G: 2 INJECTION, SOLUTION INTRAVENOUS at 23:20

## 2024-02-28 RX ADMIN — ONDANSETRON 4 MG: 2 INJECTION INTRAMUSCULAR; INTRAVENOUS at 14:35

## 2024-02-28 RX ADMIN — NEOSTIGMINE METHYLSULFATE 3.5 MG: 1 INJECTION INTRAVENOUS at 14:47

## 2024-02-28 RX ADMIN — SENNOSIDES 17.2 MG: 8.6 TABLET, FILM COATED ORAL at 20:55

## 2024-02-28 RX ADMIN — OXYCODONE HYDROCHLORIDE 5 MG: 5 TABLET ORAL at 20:55

## 2024-02-28 RX ADMIN — ROCURONIUM BROMIDE 10 MG: 10 INJECTION INTRAVENOUS at 14:03

## 2024-02-28 RX ADMIN — FAMOTIDINE 20 MG: 20 TABLET, FILM COATED ORAL at 20:55

## 2024-02-28 RX ADMIN — FENTANYL CITRATE 25 MCG: 50 INJECTION, SOLUTION INTRAMUSCULAR; INTRAVENOUS at 14:55

## 2024-02-28 RX ADMIN — HYDROMORPHONE HYDROCHLORIDE 0.4 MG: 1 INJECTION, SOLUTION INTRAMUSCULAR; INTRAVENOUS; SUBCUTANEOUS at 01:33

## 2024-02-28 RX ADMIN — Medication 100 MG: at 13:47

## 2024-02-28 RX ADMIN — HYDROXYCHLOROQUINE SULFATE 200 MG: 200 TABLET, FILM COATED ORAL at 20:55

## 2024-02-28 ASSESSMENT — COGNITIVE AND FUNCTIONAL STATUS - GENERAL
TOILETING: A LITTLE
DRESSING REGULAR UPPER BODY CLOTHING: A LITTLE
CLIMB 3 TO 5 STEPS WITH RAILING: A LOT
HELP NEEDED FOR BATHING: A LITTLE
TURNING FROM BACK TO SIDE WHILE IN FLAT BAD: A LITTLE
DRESSING REGULAR LOWER BODY CLOTHING: A LOT
PERSONAL GROOMING: A LITTLE
MOVING TO AND FROM BED TO CHAIR: A LITTLE
WALKING IN HOSPITAL ROOM: A LITTLE
MOBILITY SCORE: 17
DAILY ACTIVITIY SCORE: 18
MOVING FROM LYING ON BACK TO SITTING ON SIDE OF FLAT BED WITH BEDRAILS: A LITTLE
STANDING UP FROM CHAIR USING ARMS: A LITTLE

## 2024-02-28 ASSESSMENT — PAIN - FUNCTIONAL ASSESSMENT
PAIN_FUNCTIONAL_ASSESSMENT: 0-10

## 2024-02-28 ASSESSMENT — PAIN SCALES - GENERAL
PAINLEVEL_OUTOF10: 4
PAINLEVEL_OUTOF10: 0 - NO PAIN
PAINLEVEL_OUTOF10: 0 - NO PAIN
PAINLEVEL_OUTOF10: 2
PAINLEVEL_OUTOF10: 7
PAINLEVEL_OUTOF10: 0 - NO PAIN
PAINLEVEL_OUTOF10: 4
PAINLEVEL_OUTOF10: 5 - MODERATE PAIN
PAINLEVEL_OUTOF10: 0 - NO PAIN

## 2024-02-28 NOTE — ANESTHESIA PREPROCEDURE EVALUATION
Patient: Meka Shelby    Procedure Information       Date/Time: 02/28/24 1230    Procedure: Hip Fracture ORIF w/ Nail Trochanteric (Left: Hip)    Location: [facility] / [facility]    Surgeons: Justin Combs, DO            Relevant Problems   Cardiovascular   (+) Hyperlipidemia   (+) Hypertension   (+) Hypertensive emergency      Endocrine   (+) Hyponatremia   (+) Hypothyroidism      Musculoskeletal   (+) DDD (degenerative disc disease), cervical   (+) Scoliosis       Clinical information reviewed:   Tobacco  Allergies  Meds  Problems  Med Hx  Surg Hx   Fam Hx  Soc   Hx        NPO Detail:  No data recorded     Physical Exam    Airway  Mallampati: II  TM distance: >3 FB     Cardiovascular   Rhythm: regular  Rate: normal     Dental - normal exam     Pulmonary   Breath sounds clear to auscultation     Abdominal        Anesthesia Plan    History of general anesthesia?: yes  History of complications of general anesthesia?: no    ASA 3     general     intravenous induction   Postoperative administration of opioids is intended.  Anesthetic plan and risks discussed with patient.

## 2024-02-28 NOTE — CARE PLAN
Problem: Pain  Goal: Takes deep breaths with improved pain control throughout the shift  Outcome: Progressing  Goal: Turns in bed with improved pain control throughout the shift  Outcome: Progressing  Goal: Walks with improved pain control throughout the shift  Outcome: Progressing  Goal: Performs ADL's with improved pain control throughout shift  Outcome: Progressing  Goal: Participates in PT with improved pain control throughout the shift  Outcome: Progressing  Goal: Free from opioid side effects throughout the shift  Outcome: Progressing  Goal: Free from acute confusion related to pain meds throughout the shift  Outcome: Progressing     Problem: Respiratory  Goal: Clear secretions with interventions this shift  Outcome: Progressing  Goal: Minimize anxiety/maximize coping throughout shift  Outcome: Progressing  Goal: Minimal/no exertional discomfort or dyspnea this shift  Outcome: Progressing  Goal: No signs of respiratory distress (eg. Use of accessory muscles. Peds grunting)  Outcome: Progressing  Goal: Patent airway maintained this shift  Outcome: Progressing  Goal: Tolerate mechanical ventilation evidenced by VS/agitation level this shift  Outcome: Progressing  Goal: Tolerate pulmonary toileting this shift  Outcome: Progressing  Goal: Verbalize decreased shortness of breath this shift  Outcome: Progressing  Goal: Wean oxygen to maintain O2 saturation per order/standard this shift  Outcome: Progressing  Goal: Increase self care and/or family involvement in next 24 hours  Outcome: Progressing     Problem: Fall/Injury  Goal: Not fall by end of shift  Outcome: Progressing  Goal: Be free from injury by end of the shift  Outcome: Progressing  Goal: Verbalize understanding of personal risk factors for fall in the hospital  Outcome: Progressing  Goal: Verbalize understanding of risk factor reduction measures to prevent injury from fall in the home  Outcome: Progressing  Goal: Use assistive devices by end of the  shift  Outcome: Progressing  Goal: Pace activities to prevent fatigue by end of the shift  Outcome: Progressing     Problem: Skin  Goal: Decreased wound size/increased tissue granulation at next dressing change  Outcome: Progressing  Goal: Participates in plan/prevention/treatment measures  Outcome: Progressing  Goal: Prevent/manage excess moisture  Outcome: Progressing  Goal: Prevent/minimize sheer/friction injuries  Outcome: Progressing  Goal: Promote/optimize nutrition  Outcome: Progressing  Goal: Promote skin healing  Outcome: Progressing   The patient's goals for the shift include      The clinical goals for the shift include Pt will have minimal pain this shift and recieve adequate rest.    Over the shift, the patient did have minimal pain this shift and received adequate rest.

## 2024-02-28 NOTE — PROGRESS NOTES
02/28/24 0952   Discharge Planning   Living Arrangements Alone   Support Systems Children   Type of Residence Private residence   Home or Post Acute Services Post acute facilities (Rehab/SNF/etc)   Type of Post Acute Facility Services Skilled nursing   Patient expects to be discharged to: Home vs SNF   Does the patient need discharge transport arranged? Yes   RoundTrip coordination needed? Yes     Met with patient at bedside. Admitted for left femur fracture. Pt lives alone and was independent PTA with no HHC or DME. Pt was able to drive and obtain medications. Daughter lives in [facility]. Surgery planned for this afternoon. Therapy evals pending. Discussed possible need for rehab on discharge. Pt would like referral sent to [facility]. Requested [facility] send referral. [facility] Team will follow.

## 2024-02-28 NOTE — PROGRESS NOTES
CARDIOLOGY SERVICE   CONSULT Progress note   CVM Dr. Trevon Fuller  [facility]    PATIENT NAME: Meka Shelby  PATIENT MRN: [id number]  SERVICE DATE:  2/28/2024  SERVICE TIME: 4:14 PM    CONSULTANT: Trevon Fuller MD  PRIMARY CARE PHYSICIAN: No primary care provider on file.  ATTENDING PHYSICIAN: Heron Santana MD      IMPRESSIONS:  Mechanical fall resulting in left femur fracture underwent surgery 2/28/2024  Elevated cardiac markers at 160 will obtain more markers  Mild systolic dysfunction EF 45-50% with apical hypokinesis by echocardiogram 2/28/2024  Hypertension  Hyperlipidemia  Thyroid disease  Lichen planus, SLE, scoliosis and osteopenia cervical arthritis  Card meds PTA: Lisinopril 20 daily, Norvasc 2.5 daily, simvastatin 20    RECOMMENDATIONS:  Discussed results of echocardiogram    SIGNATURE: Trevon Fuller MD   OFFICE: [phone]    ================================================================    SUBJECTIVE: Comfortable    CURRENT CARDIOVASCULAR MEDICATIONS:  No current facility-administered medications on file prior to encounter.     Current Outpatient Medications on File Prior to Encounter   Medication Sig Dispense Refill    amLODIPine (Norvasc) 2.5 mg tablet Take 1 tablet (2.5 mg) by mouth once daily.      cholecalciferol (Vitamin D-3) 50 MCG (2000 UT) tablet Take 1 tablet (50 mcg) by mouth once daily.      famotidine (Pepcid) 20 mg tablet Take 1 tablet (20 mg) by mouth once daily at bedtime.      hydroxychloroquine (Plaquenil) 200 mg tablet Take 1 tablet (200 mg) by mouth 2 times a day.      levothyroxine (Synthroid) 112 mcg tablet Take 1 tablet (112 mcg) by mouth once daily in the morning. Take before meals.      lisinopril 20 mg tablet Take 1 tablet (20 mg) by mouth once daily.      nabumetone (Relafen) 500 mg tablet Take 2 tablets (1,000 mg) by mouth once daily.      simvastatin (Zocor) 20 mg tablet Take 0.5 tablets (10 mg) by mouth once daily at bedtime.      travoprost (Travatan Z) 0.004 % drops  "ophthalmic solution Administer 1 drop into both eyes once daily at bedtime.          PHYSICAL EXAM:  /70 (BP Location: Right arm, Patient Position: Sitting)   Pulse 60   Temp 36 °C (96.8 °F) (Temporal)   Resp 19   Ht 1.575 m (5' 2\")   Wt 59 kg (130 lb)   SpO2 99%   BMI 23.78 kg/m²   Awake Alert   Neck: No JVD  Cardiac:  S1 S2  Resp: Clear  Ext: No peripheral edema    LABS:  Lab Results   Component Value Date    WBC 8.9 02/28/2024    HGB 11.5 (L) 02/28/2024    HCT 34.8 (L) 02/28/2024    MCV 88 02/28/2024     02/28/2024      Lab Results   Component Value Date    GLUCOSE 111 (H) 02/28/2024    CALCIUM 8.3 (L) 02/28/2024     (L) 02/28/2024    K 3.8 02/28/2024    CO2 23 02/28/2024    CL 99 02/28/2024    BUN 14 02/28/2024    CREATININE 0.51 02/28/2024          "

## 2024-02-28 NOTE — OP NOTE
Hip Fracture ORIF w/ Nail Trochanteric (L) Operative Note     Date: 2024 - 2024  OR Location: [facility] OR    Name: Meka Shelby : 1940, Age: 83 y.o., MRN: [id number], Sex: female      Preoperative diagnosis: Displaced unstable left intertrochanteric proximal femur fracture    Postoperative diagnosis: Displaced unstable left intertrochanteric proximal femur fracture    Procedure planned: Open treatment left intertrochanteric proximal femur fracture by way of trochanteric fixation nail    Procedure performed: Open treatment left intertrochanteric proximal femur fracture by way of trochanteric fixation nail    Surgeon: Justin Combs D.O.    Assistant: JANAY La  The physician assistant was present to the entire case. Given the nature of the disease process and the procedure to be performed a skilled surgical assistant was necessary during the case. The assistant was necessary in order to hold retractors and directly assist in the operation. A certified scrub tech was at the back table managing instruments and supplies for the surgical case.    Anesthesia: General    Estimated blood loss: Approximately 75 cc    Drains: None    Tourniquet: None    Specimens: None    Implants: Synthes short TFN a    Indications for procedure: The patient sustained injury to the left proximal femur.  X-rays showed left intertrochanteric proximal femur fracture.  Treatment options were discussed.  Recommendations were made for medical optimization, surgical clearance, and operative stabilization by way of trochanteric fixation nail.  After full discussion regarding risks benefits and alternatives the patient elected to proceed forth with surgery.  Informed consent was signed and placed in the chart.    Complications: None noted at the time of surgery    Description of procedure: The patient was taken to the operative suite.  While still in the hospital bed the patient was administered appropriate IV antibiotics and  general anesthesia.  The patient was then carefully transported to the fracture table where she was positioned in the hemilithotomy position.  The left lower extremity was placed in a well-padded fashion in the traction boot.  The right lower extremity was secured in a position of hip flexion/abduction and knee flexion in a well-padded fashion.  Fluoroscopic imaging was brought in.  Closed reduction maneuvers including traction and rotation were performed to demonstrate appropriate reduction could be achieved preoperatively.  Feeling satisfied with the ability to reduce the fracture the patient was prepped and draped in the normal sterile fashion.  After prepping and draping fluoroscopic imaging was brought in.  The guidepin was started at the appropriate start point in the proximal femur and advanced in the appropriate trajectory to lie in the appropriate position in the shaft segment.  The opening reamer was then placed over the guidepin using the soft tissue protection sleeve.  Guidepin an opening reamer more removed.  The definitive nail was then coupled to the insertion handle and advanced to lie in the appropriate position.  This was confirmed by way of fluoroscopy.  The guide sleeve assembly for insertion of the compression screw was then advanced down to bone.  The guidepin for the compression screw was then advanced up into the head and neck segment taking care to achieve appropriate positioning in the femoral head and neck.  Standard techniques were then used to determine the appropriate screw length, open the lateral cortex, overdrill the guidepin, and insert the compression screw.  The screw was advanced to the appropriate depth and locked up top, backing off a half turn to allow for compression across the fracture plane.  The guide sleeve for the distal locking screw was then advanced down to bone and standard techniques were used to drill and insert the distal locking screw to the appropriate depth.   All insertion aids were removed.  Irrigation was performed and wounds closed in a layered fashion.  Care was taken to ensure appropriate hemostasis prior to closure.  Dressings were placed and the patient was allowed to arise from anesthesia.  The patient was carefully transported from the operating table to the hospital bed and taken to recovery in stable condition.  Overall the patient tolerated the procedure well.    Disposition: Stable to PACU                Justin Combs  Phone Number: [phone]

## 2024-02-28 NOTE — CONSULTS
CARDIOLOGY SERVICE - Initial CONSULT    CVM Dr. Trevon Fuller  [facility] [place]    PATIENT NAME: Meka Shelby  PATIENT MRN: [id number]  SERVICE DATE:  2/27/2024  SERVICE TIME: 8:57 PM    CONSULTANT: Trevon Fuller MD  PRIMARY CARE PHYSICIAN: No primary care provider on file.  ATTENDING PHYSICIAN: Heron Santana MD      IMPRESSIONS:  Mechanical fall resulting in left femur fracture for surgery needs cardiac clearance  Elevated cardiac markers at 160 will obtain more markers  Hypertension  Hyperlipidemia  Thyroid disease  Lichen planus, SLE, scoliosis and osteopenia cervical arthritis  Card meds PTA: Lisinopril 20 daily, Norvasc 2.5 daily, simvastatin 20    RECOMMENDATIONS:  Telemetry  More cardiac markers  CK and CK-MB  Echo    Thank you for this consultation.      SIGNATURE: Trevon Fuller MD   OFFICE: [phone]    ================================================================    REASON FOR CONSULTATION: Elevated cardiac markers    HISTORY: Meka Shelby is a 83 y.o. female who presented after she lost balance fell and was unable to walk due to left hip pain.  No chest pain no palpitation or dizziness no shortness of breath.  In ED /81 sat 94%  and 160 first-degree AVB no acute changes, mild cardiomegaly    PAST MEDICAL HISTORY:    History reviewed. No pertinent past medical history.    PAST SURGICAL HISTORY:  History reviewed. No pertinent surgical history.    MEDICATIONS:  No current facility-administered medications on file prior to encounter.     Current Outpatient Medications on File Prior to Encounter   Medication Sig Dispense Refill    amLODIPine (Norvasc) 2.5 mg tablet Take 1 tablet (2.5 mg) by mouth once daily.      cholecalciferol (Vitamin D-3) 50 MCG (2000 UT) tablet Take 1 tablet (50 mcg) by mouth once daily.      famotidine (Pepcid) 20 mg tablet Take 1 tablet (20 mg) by mouth once daily at bedtime.      hydroxychloroquine (Plaquenil) 200 mg tablet Take 1 tablet (200 mg) by mouth 2 times a day.       levothyroxine (Synthroid) 112 mcg tablet Take 1 tablet (112 mcg) by mouth once daily in the morning. Take before meals.      lisinopril 20 mg tablet Take 1 tablet (20 mg) by mouth once daily.      nabumetone (Relafen) 500 mg tablet Take 2 tablets (1,000 mg) by mouth once daily.      simvastatin (Zocor) 20 mg tablet Take 0.5 tablets (10 mg) by mouth once daily at bedtime.      travoprost (Travatan Z) 0.004 % drops ophthalmic solution Administer 1 drop into both eyes once daily at bedtime.         ALLERGIES AND DRUG INTOLERANCES: No Known Allergies    FAMILY HISTORY:  No family history on file.    SOCIAL HISTORY:    Social History     Socioeconomic History    Marital status: Unknown     Spouse name: Not on file    Number of children: Not on file    Years of education: Not on file    Highest education level: Not on file   Occupational History    Not on file   Tobacco Use    Smoking status: Unknown    Smokeless tobacco: Not on file   Substance and Sexual Activity    Alcohol use: Not on file    Drug use: Not on file    Sexual activity: Not on file   Other Topics Concern    Not on file   Social History Narrative    Not on file     Social Determinants of Health     Financial Resource Strain: Low Risk  (2/27/2024)    Overall Financial Resource Strain (CARDIA)     Difficulty of Paying Living Expenses: Not very hard   Food Insecurity: Not on file   Transportation Needs: No Transportation Needs (2/27/2024)    PRAPARE - Transportation     Lack of Transportation (Medical): No     Lack of Transportation (Non-Medical): No   Physical Activity: Not on file   Stress: Not on file   Social Connections: Not on file   Intimate Partner Violence: Not on file   Housing Stability: Low Risk  (2/27/2024)    Housing Stability Vital Sign     Unable to Pay for Housing in the Last Year: No     Number of Places Lived in the Last Year: 1     Unstable Housing in the Last Year: No       COMPLETE REVIEW OF SYSTEMS:  12 systems were reviewed with the  "patient heart and a chart       PHYSICAL EXAM:  /88 (BP Location: Right arm, Patient Position: Lying)   Pulse 64   Temp 36.4 °C (97.5 °F) (Temporal)   Resp 16   Ht 1.575 m (5' 2\")   Wt 59 kg (130 lb)   SpO2 95%   BMI 23.78 kg/m²   Not in distress  Awake alert  Heart RRR  Lungs decreased BSs  Extremities no edema      LABS:  Lab Results   Component Value Date    GLUCOSE 197 (H) 02/27/2024    CALCIUM 9.2 02/27/2024     (L) 02/27/2024    K 4.0 02/27/2024    CO2 27 02/27/2024    CL 96 (L) 02/27/2024    BUN 19 02/27/2024    CREATININE 0.73 02/27/2024      Lab Results   Component Value Date    WBC 16.8 (H) 02/27/2024    HGB 13.5 02/27/2024    HCT 41.0 02/27/2024    MCV 90 02/27/2024     02/27/2024        "

## 2024-02-28 NOTE — DOCUMENTATION CLARIFICATION NOTE
"    PATIENT:               JESSICA CAMARGO  ACCT #:                  [id number]  MRN:                       [id number]  :                       1940  ADMIT DATE:       2024 11:12 AM  DISCH DATE:  RESPONDING PROVIDER #:        [id number]          PROVIDER RESPONSE TEXT:    Non-ischemic myocardial injury    CDI QUERY TEXT:    [facility]    Instruction:    Based on your assessment of the patient and the clinical information, please provide the requested documentation by clicking on the appropriate radio button and enter any additional information if prompted.    Question: Is there a diagnosis indicative of the patient elevated Troponins and symptoms    When answering this query, please exercise your independent professional judgment. The fact that a question is being asked, does not imply that any particular answer is desired or expected.    The patient's clinical indicators include:  Clinical Information: Patient with noted hypertensive urgency    Clinical Indicators: Per ED, \"presenting with a closed displaced intertrochanteric fracture of the left femur after fall; ED course complicated by hypertensive urgency with mild troponin elevation, although patient declines any shortness of breath or chest discomfort.\"    Per H and P, \"high-sensitivity troponin 156 -> 160, ECG with sinus rhythm with first-degree AV block.\"    24 12:35  Troponin I, High Sensitivity: 156    24 15:01  Troponin I, High Sensitivity: 160    24 05:22  Troponin I, High Sensitivity: 252    Treatment: EKG, trending trops, cardiac consult, tele monitoring    Risk Factors: 83yof with noted hypertensive urgency  Options provided:  -- Non-ischemic myocardial injury  -- Troponin elevation due to other, Please specify additional information below  -- Other - I will add my own diagnosis  -- Refer to Clinical Documentation Reviewer    Query created by: Vandana Squires on 2024 4:22 PM      Electronically signed by:  MARY JO MULLIGAN MD " 2/28/2024 4:42 PM

## 2024-02-28 NOTE — CARE PLAN
Problem: Pain  Goal: Takes deep breaths with improved pain control throughout the shift  2/28/2024 0419 by Loreta Pederson RN  Outcome: Progressing  2/28/2024 0419 by Loreta Pederson RN  Outcome: Progressing  Goal: Turns in bed with improved pain control throughout the shift  2/28/2024 0419 by Loreta Pederson RN  Outcome: Progressing  2/28/2024 0419 by Loreta Pederson RN  Outcome: Progressing  Goal: Walks with improved pain control throughout the shift  2/28/2024 0419 by Loreta Pederson RN  Outcome: Progressing  2/28/2024 0419 by Loreta Pederson RN  Outcome: Progressing  Goal: Performs ADL's with improved pain control throughout shift  2/28/2024 0419 by Loreta Pederson RN  Outcome: Progressing  2/28/2024 0419 by Loreta Pederson RN  Outcome: Progressing  Goal: Participates in PT with improved pain control throughout the shift  2/28/2024 0419 by Loreta Pederson RN  Outcome: Progressing  2/28/2024 0419 by Loreta Pederson RN  Outcome: Progressing  Goal: Free from opioid side effects throughout the shift  2/28/2024 0419 by Loreta Pederson RN  Outcome: Progressing  2/28/2024 0419 by Loreta Pederson RN  Outcome: Progressing  Goal: Free from acute confusion related to pain meds throughout the shift  2/28/2024 0419 by Loreta Pederson RN  Outcome: Progressing  2/28/2024 0419 by Loreta Pederson RN  Outcome: Progressing     Problem: Respiratory  Goal: Clear secretions with interventions this shift  2/28/2024 0419 by Loreta Pederson RN  Outcome: Progressing  2/28/2024 0419 by Loreta Pederson RN  Outcome: Progressing  Goal: Minimize anxiety/maximize coping throughout shift  2/28/2024 0419 by Loreta Pederson RN  Outcome: Progressing  2/28/2024 0419 by Loreta Pederson RN  Outcome: Progressing  Goal: Minimal/no exertional discomfort or dyspnea this shift  2/28/2024 0419 by Loreta Pederson, RN  Outcome: Progressing  2/28/2024 0419 by Loreta Pederson, RN  Outcome: Progressing  Goal: No signs of respiratory distress  (eg. Use of accessory muscles. Peds grunting)  2/28/2024 0419 by Loreta Pederson RN  Outcome: Progressing  2/28/2024 0419 by Loreta Pederson RN  Outcome: Progressing  Goal: Patent airway maintained this shift  2/28/2024 0419 by Loreta Pederson RN  Outcome: Progressing  2/28/2024 0419 by Loreta Pederson RN  Outcome: Progressing  Goal: Tolerate mechanical ventilation evidenced by VS/agitation level this shift  2/28/2024 0419 by Loreta Pederson RN  Outcome: Progressing  2/28/2024 0419 by Loreta Pederson RN  Outcome: Progressing  Goal: Tolerate pulmonary toileting this shift  2/28/2024 0419 by Loreta Pederson RN  Outcome: Progressing  2/28/2024 0419 by Loreta Pederson RN  Outcome: Progressing  Goal: Verbalize decreased shortness of breath this shift  2/28/2024 0419 by Loreta Pederson RN  Outcome: Progressing  2/28/2024 0419 by Loreta Pederson RN  Outcome: Progressing  Goal: Wean oxygen to maintain O2 saturation per order/standard this shift  2/28/2024 0419 by Loreta Pederson RN  Outcome: Progressing  2/28/2024 0419 by Loreta Pederson RN  Outcome: Progressing  Goal: Increase self care and/or family involvement in next 24 hours  2/28/2024 0419 by Loreta Pederson RN  Outcome: Progressing  2/28/2024 0419 by Loreta Pederson RN  Outcome: Progressing     Problem: Fall/Injury  Goal: Not fall by end of shift  2/28/2024 0419 by Loreta Pederson RN  Outcome: Progressing  2/28/2024 0419 by Loreta Pederson RN  Outcome: Progressing  Goal: Be free from injury by end of the shift  2/28/2024 0419 by Loreta Pederson RN  Outcome: Progressing  2/28/2024 0419 by Loreta Pederson RN  Outcome: Progressing  Goal: Verbalize understanding of personal risk factors for fall in the hospital  2/28/2024 0419 by Loreta Pederson RN  Outcome: Progressing  2/28/2024 0419 by Loreta Pederson, RN  Outcome: Progressing  Goal: Verbalize understanding of risk factor reduction measures to prevent injury from fall in the home  2/28/2024 0419 by  oLreta Pederson RN  Outcome: Progressing  2/28/2024 0419 by Loreta Pederson RN  Outcome: Progressing  Goal: Use assistive devices by end of the shift  2/28/2024 0419 by Loreta Pederson RN  Outcome: Progressing  2/28/2024 0419 by Loreta Pederson RN  Outcome: Progressing  Goal: Pace activities to prevent fatigue by end of the shift  2/28/2024 0419 by Loreta Pederson RN  Outcome: Progressing  2/28/2024 0419 by Loreta Pederson RN  Outcome: Progressing     Problem: Skin  Goal: Decreased wound size/increased tissue granulation at next dressing change  2/28/2024 0419 by Loreta Pederson RN  Outcome: Progressing  Flowsheets (Taken 2/28/2024 0419)  Decreased wound size/increased tissue granulation at next dressing change: Promote sleep for wound healing  2/28/2024 0419 by Loreta Pederson RN  Outcome: Progressing  Flowsheets (Taken 2/28/2024 0419)  Decreased wound size/increased tissue granulation at next dressing change: Promote sleep for wound healing  Goal: Participates in plan/prevention/treatment measures  2/28/2024 0419 by Loreta Pederson RN  Outcome: Progressing  Flowsheets (Taken 2/28/2024 0419)  Participates in plan/prevention/treatment measures: Elevate heels  2/28/2024 0419 by Loreta Pederson RN  Outcome: Progressing  Flowsheets (Taken 2/28/2024 0419)  Participates in plan/prevention/treatment measures: Elevate heels  Goal: Prevent/manage excess moisture  2/28/2024 0419 by Loreta Pederson RN  Outcome: Progressing  Flowsheets (Taken 2/28/2024 0419)  Prevent/manage excess moisture: Moisturize dry skin  2/28/2024 0419 by Loreta Pederson RN  Outcome: Progressing  Flowsheets (Taken 2/28/2024 0419)  Prevent/manage excess moisture: Moisturize dry skin  Goal: Prevent/minimize sheer/friction injuries  2/28/2024 0419 by Loreta Pederson RN  Outcome: Progressing  Flowsheets (Taken 2/28/2024 0419)  Prevent/minimize sheer/friction injuries: Use pull sheet  2/28/2024 0419 by Loreta Pederson, RN  Outcome:  Progressing  Flowsheets (Taken 2/28/2024 0419)  Prevent/minimize sheer/friction injuries: Use pull sheet  Goal: Promote/optimize nutrition  2/28/2024 0419 by Loreta Pederson RN  Outcome: Progressing  Flowsheets (Taken 2/28/2024 0419)  Promote/optimize nutrition: Offer water/supplements/favorite foods  2/28/2024 0419 by Loreta Pederson RN  Outcome: Progressing  Flowsheets (Taken 2/28/2024 0419)  Promote/optimize nutrition: Offer water/supplements/favorite foods  Goal: Promote skin healing  2/28/2024 0419 by Loreta Pederson RN  Outcome: Progressing  Flowsheets (Taken 2/28/2024 0419)  Promote skin healing: Assess skin/pad under line(s)/device(s)  2/28/2024 0419 by Loreta Pederson RN  Outcome: Progressing  Flowsheets (Taken 2/28/2024 0419)  Promote skin healing: Assess skin/pad under line(s)/device(s)   The patient's goals for the shift include      The clinical goals for the shift include Pt will have minimal pain this shift and recieve adequate rest.    Over the shift, the patient did receive adequate rest.

## 2024-02-28 NOTE — ANESTHESIA PROCEDURE NOTES
Airway  Date/Time: 2/28/2024 1:50 PM  Urgency: elective    Airway not difficult    Staffing  Performed: JOYA   Authorized by: Charles Muir DO    Performed by: JOYA Mcgregor  Patient location during procedure: OR    Indications and Patient Condition  Indications for airway management: anesthesia  Sedation level: deep  Preoxygenated: yes  Patient position: sniffing  MILS maintained throughout  Mask difficulty assessment: 1 - vent by mask    Final Airway Details  Final airway type: endotracheal airway      Successful airway: ETT  Cuffed: yes   Successful intubation technique: direct laryngoscopy  Blade: Topher  Blade size: #3  ETT size (mm): 7.0  Cormack-Lehane Classification: grade IIa - partial view of glottis  Placement verified by: chest auscultation and capnometry   Inital cuff pressure (cm H2O): 24  Measured from: lips  Number of attempts at approach: 1

## 2024-02-28 NOTE — ANESTHESIA POSTPROCEDURE EVALUATION
Patient: Meka Shelby    Procedure Summary       Date: 02/28/24 Room / Location: [facility] OR 05 / [facility]    Anesthesia Start: 1341 Anesthesia Stop: 1458    Procedure: Hip Fracture ORIF w/ Nail Trochanteric (Left: Hip) Diagnosis:       Closed displaced intertrochanteric fracture of left femur, initial encounter (CMS/[facility])      (Closed displaced intertrochanteric fracture of left femur, initial encounter (CMS/[facility]) [S72.142A])    Surgeons: Justin Combs DO Responsible Provider: Charles Muir DO    Anesthesia Type: general ASA Status: 3            Anesthesia Type: general    Vitals Value Taken Time   /78 02/28/24 1458   Temp 36.5 02/28/24 1458   Pulse 57 02/28/24 1458   Resp 18 02/28/24 1458   SpO2 99 02/28/24 1458       Anesthesia Post Evaluation    Patient location during evaluation: PACU  Patient participation: complete - patient participated  Level of consciousness: awake  Pain management: adequate  Airway patency: patent  Cardiovascular status: acceptable  Respiratory status: acceptable  Hydration status: acceptable  Postoperative Nausea and Vomiting: none        No notable events documented.

## 2024-02-28 NOTE — CARE PLAN
The patient's goals for the shift include      The clinical goals for the shift include Pt will have minimal pain this shift and recieve adequate rest.      Problem: Pain  Goal: Takes deep breaths with improved pain control throughout the shift  Outcome: Progressing  Goal: Turns in bed with improved pain control throughout the shift  Outcome: Progressing  Goal: Walks with improved pain control throughout the shift  Outcome: Progressing  Goal: Performs ADL's with improved pain control throughout shift  Outcome: Progressing  Goal: Participates in PT with improved pain control throughout the shift  Outcome: Progressing  Goal: Free from opioid side effects throughout the shift  Outcome: Progressing  Goal: Free from acute confusion related to pain meds throughout the shift  Outcome: Progressing     Problem: Respiratory  Goal: Clear secretions with interventions this shift  Outcome: Progressing  Goal: Minimize anxiety/maximize coping throughout shift  Outcome: Progressing  Goal: Minimal/no exertional discomfort or dyspnea this shift  Outcome: Progressing  Goal: No signs of respiratory distress (eg. Use of accessory muscles. Peds grunting)  Outcome: Progressing  Goal: Patent airway maintained this shift  Outcome: Progressing  Goal: Tolerate mechanical ventilation evidenced by VS/agitation level this shift  Outcome: Progressing  Goal: Tolerate pulmonary toileting this shift  Outcome: Progressing  Goal: Verbalize decreased shortness of breath this shift  Outcome: Progressing  Goal: Wean oxygen to maintain O2 saturation per order/standard this shift  Outcome: Progressing  Goal: Increase self care and/or family involvement in next 24 hours  Outcome: Progressing     Problem: Fall/Injury  Goal: Not fall by end of shift  Outcome: Progressing  Goal: Be free from injury by end of the shift  Outcome: Progressing  Goal: Verbalize understanding of personal risk factors for fall in the hospital  Outcome: Progressing  Goal: Verbalize  understanding of risk factor reduction measures to prevent injury from fall in the home  Outcome: Progressing  Goal: Use assistive devices by end of the shift  Outcome: Progressing  Goal: Pace activities to prevent fatigue by end of the shift  Outcome: Progressing     Problem: Skin  Goal: Decreased wound size/increased tissue granulation at next dressing change  Outcome: Progressing  Goal: Participates in plan/prevention/treatment measures  Outcome: Progressing  Goal: Prevent/manage excess moisture  Outcome: Progressing  Goal: Prevent/minimize sheer/friction injuries  Outcome: Progressing  Goal: Promote/optimize nutrition  Outcome: Progressing  Goal: Promote skin healing  Outcome: Progressing     Problem: Pain - Adult  Goal: Verbalizes/displays adequate comfort level or baseline comfort level  Outcome: Progressing     Problem: Safety - Adult  Goal: Free from fall injury  Outcome: Progressing     Problem: Discharge Planning  Goal: Discharge to home or other facility with appropriate resources  Outcome: Progressing     Problem: Chronic Conditions and Co-morbidities  Goal: Patient's chronic conditions and co-morbidity symptoms are monitored and maintained or improved  Outcome: Progressing

## 2024-02-28 NOTE — CONSULTS
"Reason For Consult  Left hip pain.    History Of Present Illness  Meka Shelby is a 83 y.o. female presenting with left hip pain status post mechanical fall yesterday.  X-rays demonstrated intertrochanteric fracture left proximal femur.  Orthopedic team was contacted for evaluation and treatment..     Past Medical History  Systemic lupus erythematosus, scoliosis, osteopenia, lichen planus, hypothyroidism, hypertension, hyperlipidemia, cervical DDD     Surgical History  She has no past surgical history on file.     Social History  She has no history on file for tobacco use, alcohol use, and drug use.    Family History  No family history on file.     Allergies  Patient has no known allergies.    Review of Systems  10 point review of systems performed and negative except as otherwise stated above.     Physical Exam  Evaluation of the left lower extremity finds palpable dorsalis pedis with this capillary refill to the toes.  Supple compartments to thigh leg and foot.  No open wounds.  No signs of infection.  EHL FHL dorsiflexion plantarflexion intact motor.  Direct tenderness about the left hip.  Pain with logroll maneuver.  Unable to perform straight leg raise.  Intact sensation to tibial sural saphenous deep and superficial peroneal nerves to light touch.  Calf supple and nontender.     Last Recorded Vitals  Blood pressure 154/79, pulse 65, temperature 36.5 °C (97.7 °F), temperature source Temporal, resp. rate 16, height 1.575 m (5' 2\"), weight 59 kg (130 lb), SpO2 95 %.    Relevant Results      Scheduled medications  amLODIPine, 2.5 mg, oral, Daily  cholecalciferol, 2,000 Units, oral, Daily  famotidine, 20 mg, oral, Nightly  heparin (porcine), 5,000 Units, subcutaneous, q8h OLIVIA  hydroxychloroquine, 200 mg, oral, BID  latanoprost, 1 drop, Both Eyes, Nightly  levothyroxine, 112 mcg, oral, Daily before breakfast  lisinopril, 20 mg, oral, Daily  sennosides, 2 tablet, oral, BID  simvastatin, 10 mg, oral, " Nightly      Continuous medications     PRN medications  PRN medications: acetaminophen, hydrALAZINE, HYDROmorphone, HYDROmorphone  Results for orders placed or performed during the hospital encounter of 02/27/24 (from the past 24 hour(s))   CBC and Auto Differential   Result Value Ref Range    WBC 16.8 (H) 4.4 - 11.3 x10*3/uL    nRBC 0.0 0.0 - 0.0 /100 WBCs    RBC 4.55 4.00 - 5.20 x10*6/uL    Hemoglobin 13.5 12.0 - 16.0 g/dL    Hematocrit 41.0 36.0 - 46.0 %    MCV 90 80 - 100 fL    MCH 29.7 26.0 - 34.0 pg    MCHC 32.9 32.0 - 36.0 g/dL    RDW 12.9 11.5 - 14.5 %    Platelets 216 150 - 450 x10*3/uL    Neutrophils % 89.2 40.0 - 80.0 %    Immature Granulocytes %, Automated 0.3 0.0 - 0.9 %    Lymphocytes % 6.6 13.0 - 44.0 %    Monocytes % 3.0 2.0 - 10.0 %    Eosinophils % 0.5 0.0 - 6.0 %    Basophils % 0.4 0.0 - 2.0 %    Neutrophils Absolute 14.99 (H) 1.60 - 5.50 x10*3/uL    Immature Granulocytes Absolute, Automated 0.05 0.00 - 0.50 x10*3/uL    Lymphocytes Absolute 1.11 0.80 - 3.00 x10*3/uL    Monocytes Absolute 0.50 0.05 - 0.80 x10*3/uL    Eosinophils Absolute 0.09 0.00 - 0.40 x10*3/uL    Basophils Absolute 0.06 0.00 - 0.10 x10*3/uL   Comprehensive Metabolic Panel   Result Value Ref Range    Glucose 197 (H) 74 - 99 mg/dL    Sodium 131 (L) 136 - 145 mmol/L    Potassium 4.0 3.5 - 5.3 mmol/L    Chloride 96 (L) 98 - 107 mmol/L    Bicarbonate 27 21 - 32 mmol/L    Anion Gap 12 10 - 20 mmol/L    Urea Nitrogen 19 6 - 23 mg/dL    Creatinine 0.73 0.50 - 1.05 mg/dL    eGFR 82 >60 mL/min/1.73m*2    Calcium 9.2 8.6 - 10.3 mg/dL    Albumin 4.3 3.4 - 5.0 g/dL    Alkaline Phosphatase 49 33 - 136 U/L    Total Protein 6.9 6.4 - 8.2 g/dL    AST 27 9 - 39 U/L    Bilirubin, Total 0.5 0.0 - 1.2 mg/dL    ALT 19 7 - 45 U/L   aPTT   Result Value Ref Range    aPTT 28 27 - 38 seconds   Protime-INR   Result Value Ref Range    Protime 11.6 9.8 - 12.8 seconds    INR 1.0 0.9 - 1.1   Troponin I, High Sensitivity, Initial   Result Value Ref Range     Troponin I, High Sensitivity 156 (HH) 0 - 13 ng/L   B-type natriuretic peptide   Result Value Ref Range     (H) 0 - 99 pg/mL   Magnesium   Result Value Ref Range    Magnesium 2.02 1.60 - 2.40 mg/dL   ECG 12 lead   Result Value Ref Range    Ventricular Rate 64 BPM    Atrial Rate 64 BPM    VA Interval 322 ms    QRS Duration 130 ms    QT Interval 448 ms    QTC Calculation(Bazett) 462 ms    P Axis 49 degrees    R Axis -75 degrees    T Axis 94 degrees    QRS Count 10 beats    Q Onset 205 ms    P Onset 44 ms    P Offset 99 ms    T Offset 429 ms    QTC Fredericia 457 ms   Troponin, High Sensitivity, 1 Hour   Result Value Ref Range    Troponin I, High Sensitivity 160 (HH) 0 - 13 ng/L   Type and Screen   Result Value Ref Range    ABO TYPE B     Rh TYPE POS     ANTIBODY SCREEN NEG    Urinalysis with Reflex Culture and Microscopic   Result Value Ref Range    Color, Urine Yellow Straw, Yellow    Appearance, Urine Clear Clear    Specific Gravity, Urine 1.015 1.005 - 1.035    pH, Urine 6.0 5.0, 5.5, 6.0, 6.5, 7.0, 7.5, 8.0    Protein, Urine NEGATIVE NEGATIVE mg/dL    Glucose, Urine NEGATIVE NEGATIVE mg/dL    Blood, Urine NEGATIVE NEGATIVE    Ketones, Urine NEGATIVE NEGATIVE mg/dL    Bilirubin, Urine NEGATIVE NEGATIVE    Urobilinogen, Urine <2.0 <2.0 mg/dL    Nitrite, Urine NEGATIVE NEGATIVE    Leukocyte Esterase, Urine NEGATIVE NEGATIVE   Extra Urine Gray Tube   Result Value Ref Range    Extra Tube Hold for add-ons.    Creatine Kinase   Result Value Ref Range    Creatine Kinase 306 (H) 0 - 215 U/L   CBC   Result Value Ref Range    WBC 8.9 4.4 - 11.3 x10*3/uL    nRBC 0.0 0.0 - 0.0 /100 WBCs    RBC 3.94 (L) 4.00 - 5.20 x10*6/uL    Hemoglobin 11.5 (L) 12.0 - 16.0 g/dL    Hematocrit 34.8 (L) 36.0 - 46.0 %    MCV 88 80 - 100 fL    MCH 29.2 26.0 - 34.0 pg    MCHC 33.0 32.0 - 36.0 g/dL    RDW 13.0 11.5 - 14.5 %    Platelets 185 150 - 450 x10*3/uL   Magnesium   Result Value Ref Range    Magnesium 1.71 1.60 - 2.40 mg/dL   Basic  Metabolic Panel   Result Value Ref Range    Glucose 101 (H) 74 - 99 mg/dL    Sodium 130 (L) 136 - 145 mmol/L    Potassium 3.7 3.5 - 5.3 mmol/L    Chloride 98 98 - 107 mmol/L    Bicarbonate 24 21 - 32 mmol/L    Anion Gap 12 10 - 20 mmol/L    Urea Nitrogen 14 6 - 23 mg/dL    Creatinine 0.45 (L) 0.50 - 1.05 mg/dL    eGFR >90 >60 mL/min/1.73m*2    Calcium 8.3 (L) 8.6 - 10.3 mg/dL   Protime-INR   Result Value Ref Range    Protime 12.3 9.8 - 12.8 seconds    INR 1.1 0.9 - 1.1   Lipid Panel   Result Value Ref Range    Cholesterol 136 0 - 199 mg/dL    HDL-Cholesterol 53.1 mg/dL    Cholesterol/HDL Ratio 2.6     LDL Calculated 64 <=99 mg/dL    VLDL 19 0 - 40 mg/dL    Triglycerides 97 0 - 149 mg/dL    Non HDL Cholesterol 83 0 - 149 mg/dL        Assessment/Plan     Assessment: Comminuted displaced unstable intertrochanteric fracture left proximal femur    Plan: Treatment options were discussed.  We talked about operative and nonoperative strategies.  Patient elects to proceed forth with surgery by way of trochanteric nail fixation to left proximal femur fracture.  Plan for medical optimization and surgical clearance and operative stabilization later today.    Justin Combs DO

## 2024-02-29 LAB
ANION GAP SERPL CALC-SCNC: 11 MMOL/L (ref 10–20)
ANION GAP SERPL CALC-SCNC: 12 MMOL/L (ref 10–20)
BUN SERPL-MCNC: 13 MG/DL (ref 6–23)
BUN SERPL-MCNC: 22 MG/DL (ref 6–23)
CALCIUM SERPL-MCNC: 8.2 MG/DL (ref 8.6–10.3)
CALCIUM SERPL-MCNC: 8.3 MG/DL (ref 8.6–10.3)
CHLORIDE SERPL-SCNC: 100 MMOL/L (ref 98–107)
CHLORIDE SERPL-SCNC: 97 MMOL/L (ref 98–107)
CO2 SERPL-SCNC: 24 MMOL/L (ref 21–32)
CO2 SERPL-SCNC: 25 MMOL/L (ref 21–32)
CREAT SERPL-MCNC: 0.62 MG/DL (ref 0.5–1.05)
CREAT SERPL-MCNC: 0.95 MG/DL (ref 0.5–1.05)
EGFRCR SERPLBLD CKD-EPI 2021: 60 ML/MIN/1.73M*2
EGFRCR SERPLBLD CKD-EPI 2021: 89 ML/MIN/1.73M*2
ERYTHROCYTE [DISTWIDTH] IN BLOOD BY AUTOMATED COUNT: 13.2 % (ref 11.5–14.5)
GLUCOSE SERPL-MCNC: 100 MG/DL (ref 74–99)
GLUCOSE SERPL-MCNC: 149 MG/DL (ref 74–99)
HCT VFR BLD AUTO: 30.9 % (ref 36–46)
HGB BLD-MCNC: 10.2 G/DL (ref 12–16)
MAGNESIUM SERPL-MCNC: 1.88 MG/DL (ref 1.6–2.4)
MCH RBC QN AUTO: 29.7 PG (ref 26–34)
MCHC RBC AUTO-ENTMCNC: 33 G/DL (ref 32–36)
MCV RBC AUTO: 90 FL (ref 80–100)
NRBC BLD-RTO: 0 /100 WBCS (ref 0–0)
PLATELET # BLD AUTO: 165 X10*3/UL (ref 150–450)
POTASSIUM SERPL-SCNC: 4 MMOL/L (ref 3.5–5.3)
POTASSIUM SERPL-SCNC: 4.1 MMOL/L (ref 3.5–5.3)
RBC # BLD AUTO: 3.43 X10*6/UL (ref 4–5.2)
SODIUM SERPL-SCNC: 130 MMOL/L (ref 136–145)
SODIUM SERPL-SCNC: 131 MMOL/L (ref 136–145)
WBC # BLD AUTO: 9.1 X10*3/UL (ref 4.4–11.3)

## 2024-02-29 PROCEDURE — 2500000001 HC RX 250 WO HCPCS SELF ADMINISTERED DRUGS (ALT 637 FOR MEDICARE OP): Performed by: SPECIALIST

## 2024-02-29 PROCEDURE — 97535 SELF CARE MNGMENT TRAINING: CPT | Mod: GO | Performed by: OCCUPATIONAL THERAPIST

## 2024-02-29 PROCEDURE — 85027 COMPLETE CBC AUTOMATED: CPT | Performed by: INTERNAL MEDICINE

## 2024-02-29 PROCEDURE — 2500000001 HC RX 250 WO HCPCS SELF ADMINISTERED DRUGS (ALT 637 FOR MEDICARE OP): Performed by: INTERNAL MEDICINE

## 2024-02-29 PROCEDURE — 99231 SBSQ HOSP IP/OBS SF/LOW 25: CPT | Performed by: PHYSICIAN ASSISTANT

## 2024-02-29 PROCEDURE — 36415 COLL VENOUS BLD VENIPUNCTURE: CPT | Performed by: INTERNAL MEDICINE

## 2024-02-29 PROCEDURE — 1200000002 HC GENERAL ROOM WITH TELEMETRY DAILY

## 2024-02-29 PROCEDURE — 83735 ASSAY OF MAGNESIUM: CPT | Performed by: INTERNAL MEDICINE

## 2024-02-29 PROCEDURE — 97530 THERAPEUTIC ACTIVITIES: CPT | Mod: GP

## 2024-02-29 PROCEDURE — 99232 SBSQ HOSP IP/OBS MODERATE 35: CPT | Performed by: INTERNAL MEDICINE

## 2024-02-29 PROCEDURE — 97161 PT EVAL LOW COMPLEX 20 MIN: CPT | Mod: GP

## 2024-02-29 PROCEDURE — 2500000002 HC RX 250 W HCPCS SELF ADMINISTERED DRUGS (ALT 637 FOR MEDICARE OP, ALT 636 FOR OP/ED): Performed by: INTERNAL MEDICINE

## 2024-02-29 PROCEDURE — 80048 BASIC METABOLIC PNL TOTAL CA: CPT | Performed by: INTERNAL MEDICINE

## 2024-02-29 PROCEDURE — 2500000004 HC RX 250 GENERAL PHARMACY W/ HCPCS (ALT 636 FOR OP/ED): Performed by: PHYSICIAN ASSISTANT

## 2024-02-29 PROCEDURE — 97166 OT EVAL MOD COMPLEX 45 MIN: CPT | Mod: GO | Performed by: OCCUPATIONAL THERAPIST

## 2024-02-29 PROCEDURE — 2500000004 HC RX 250 GENERAL PHARMACY W/ HCPCS (ALT 636 FOR OP/ED): Performed by: INTERNAL MEDICINE

## 2024-02-29 RX ORDER — FUROSEMIDE 10 MG/ML
20 INJECTION INTRAMUSCULAR; INTRAVENOUS ONCE
Status: COMPLETED | OUTPATIENT
Start: 2024-02-29 | End: 2024-02-29

## 2024-02-29 RX ORDER — ISOSORBIDE MONONITRATE 30 MG/1
15 TABLET, EXTENDED RELEASE ORAL DAILY
Status: DISCONTINUED | OUTPATIENT
Start: 2024-02-29 | End: 2024-03-01 | Stop reason: HOSPADM

## 2024-02-29 RX ORDER — ASPIRIN 81 MG/1
81 TABLET ORAL DAILY
Status: DISCONTINUED | OUTPATIENT
Start: 2024-02-29 | End: 2024-03-01 | Stop reason: HOSPADM

## 2024-02-29 RX ADMIN — ASPIRIN 81 MG: 81 TABLET, COATED ORAL at 09:54

## 2024-02-29 RX ADMIN — CEFAZOLIN SODIUM 2 G: 2 INJECTION, SOLUTION INTRAVENOUS at 06:37

## 2024-02-29 RX ADMIN — ACETAMINOPHEN 650 MG: 325 TABLET ORAL at 06:37

## 2024-02-29 RX ADMIN — FUROSEMIDE 20 MG: 10 INJECTION, SOLUTION INTRAMUSCULAR; INTRAVENOUS at 13:17

## 2024-02-29 RX ADMIN — ENOXAPARIN SODIUM 40 MG: 40 INJECTION SUBCUTANEOUS at 21:24

## 2024-02-29 RX ADMIN — AMLODIPINE BESYLATE 2.5 MG: 2.5 TABLET ORAL at 09:54

## 2024-02-29 RX ADMIN — SIMVASTATIN 10 MG: 10 TABLET, FILM COATED ORAL at 20:05

## 2024-02-29 RX ADMIN — ISOSORBIDE MONONITRATE 15 MG: 30 TABLET, EXTENDED RELEASE ORAL at 09:54

## 2024-02-29 RX ADMIN — ACETAMINOPHEN 650 MG: 325 TABLET ORAL at 17:29

## 2024-02-29 RX ADMIN — LATANOPROST 1 DROP: 50 SOLUTION OPHTHALMIC at 20:06

## 2024-02-29 RX ADMIN — LEVOTHYROXINE SODIUM 112 MCG: 112 TABLET ORAL at 06:37

## 2024-02-29 RX ADMIN — OXYCODONE HYDROCHLORIDE 5 MG: 5 TABLET ORAL at 09:59

## 2024-02-29 RX ADMIN — Medication 2000 UNITS: at 09:54

## 2024-02-29 RX ADMIN — HYDROXYCHLOROQUINE SULFATE 200 MG: 200 TABLET, FILM COATED ORAL at 09:54

## 2024-02-29 RX ADMIN — HYDROXYCHLOROQUINE SULFATE 200 MG: 200 TABLET, FILM COATED ORAL at 20:05

## 2024-02-29 RX ADMIN — SENNOSIDES 17.2 MG: 8.6 TABLET, FILM COATED ORAL at 09:54

## 2024-02-29 RX ADMIN — ACETAMINOPHEN 650 MG: 325 TABLET ORAL at 11:43

## 2024-02-29 RX ADMIN — SENNOSIDES 17.2 MG: 8.6 TABLET, FILM COATED ORAL at 20:05

## 2024-02-29 RX ADMIN — LISINOPRIL 20 MG: 20 TABLET ORAL at 09:54

## 2024-02-29 RX ADMIN — FAMOTIDINE 20 MG: 20 TABLET, FILM COATED ORAL at 20:05

## 2024-02-29 ASSESSMENT — COGNITIVE AND FUNCTIONAL STATUS - GENERAL
TURNING FROM BACK TO SIDE WHILE IN FLAT BAD: A LOT
WALKING IN HOSPITAL ROOM: A LOT
DAILY ACTIVITIY SCORE: 11
DRESSING REGULAR UPPER BODY CLOTHING: A LOT
PERSONAL GROOMING: A LITTLE
MOBILITY SCORE: 11
HELP NEEDED FOR BATHING: TOTAL
DRESSING REGULAR LOWER BODY CLOTHING: TOTAL
STANDING UP FROM CHAIR USING ARMS: A LOT
CLIMB 3 TO 5 STEPS WITH RAILING: TOTAL
MOVING TO AND FROM BED TO CHAIR: A LOT
EATING MEALS: A LITTLE
TOILETING: TOTAL
MOVING FROM LYING ON BACK TO SITTING ON SIDE OF FLAT BED WITH BEDRAILS: A LOT

## 2024-02-29 ASSESSMENT — PAIN SCALES - GENERAL
PAINLEVEL_OUTOF10: 0 - NO PAIN
PAINLEVEL_OUTOF10: 5 - MODERATE PAIN
PAINLEVEL_OUTOF10: 0 - NO PAIN
PAINLEVEL_OUTOF10: 0 - NO PAIN

## 2024-02-29 ASSESSMENT — PAIN - FUNCTIONAL ASSESSMENT
PAIN_FUNCTIONAL_ASSESSMENT: 0-10

## 2024-02-29 ASSESSMENT — ACTIVITIES OF DAILY LIVING (ADL)
ADL_ASSISTANCE: INDEPENDENT
HOME_MANAGEMENT_TIME_ENTRY: 11

## 2024-02-29 NOTE — NURSING NOTE
This pt remained stable throughout this shift. VSS. Pain controlled. This pt was unable to work with PT/OT due to becoming dizzy when working with them. Safety maintained with nonskid footwear and bed alarms. Call light in reach. Neurovascular assessments stable and dressings CDI. 20mg of IV lasix was given this shift to help diurese this patient. Repeat bmp later on tonight to monitor Na levels. Adequate intake and output. This pt has no questions or concerns at this time.

## 2024-02-29 NOTE — NURSING NOTE
Provider rachana informed of low urine output this shift. This nurse bladder scanned patient to ensure there were no troncoso complications. Bladder scan does not show any retained urine.

## 2024-02-29 NOTE — PROGRESS NOTES
Physical Therapy    Physical Therapy Evaluation and treatment    Patient Name: Meka Shelby  MRN: [id number]  Today's Date: 2/29/2024   Time Calculation  Start Time: 1034  Stop Time: 1100  Time Calculation (min): 26 min    Assessment/Plan   PT Assessment  PT Assessment Results: Decreased strength, Decreased range of motion, Decreased endurance, Impaired balance, Decreased mobility, Decreased safety awareness, Pain, Impaired judgement, Decreased cognition, Orthopedic restrictions  Rehab Prognosis: Good  Evaluation/Treatment Tolerance: Patient limited by pain, Patient limited by fatigue (limited by dizziness)  Medical Staff Made Aware: Yes  End of Session Communication: Bedside nurse  Assessment Comment: Pt presents today below baseline level of function and requires continued PT during hospital stay. Pt presents today below baseline level of function and requires continued PT during hospital stay. Pt requires PT at a moderate intensity level at discharge to maximize functional mobility and safety.Pt requires PT at a moderate intensity level at discharge to maximize functional mobility and safety.    End of Session Patient Position: Alarm on, Bed, 3 rail up  IP OR SWING BED PT PLAN  Inpatient or Swing Bed: Inpatient  PT Plan  Treatment/Interventions: Bed mobility, Transfer training, Gait training, Balance training, Neuromuscular re-education, Strengthening, Endurance training, Range of motion, Therapeutic exercise, Therapeutic activity, Home exercise program  PT Plan: Skilled PT  PT Frequency: Daily  PT Discharge Recommendations: Moderate intensity level of continued care  Equipment Recommended upon Discharge: Wheeled walker  PT Recommended Transfer Status: Assist x2 (FWW, gait belt)  PT - OK to Discharge: Yes (To next level of care when cleared by medical team   )    Subjective       General Visit Information:  General  Reason for Referral: recent surgery  Referred By: Dr. Combs  Past Medical History Relevant to Rehab:  Admitted following fall. Pt found to have closed displced intertrochanteric fracture of left femur. Pt underwent left hip TFN 2/28/24. PMH: lupus, scoliosis, osteopenia, lichen planus, HTN, HLD, cervical DDD  Family/Caregiver Present: No  Co-Treatment: OT  Co-Treatment Reason: to maximize pt safety and functional mobility  Prior to Session Communication: Bedside nurse  Patient Position Received: Bed, 3 rail up, Alarm on  Preferred Learning Style: verbal  General Comment: Pt agreeable to PT, nursing cleared for treatment.    Home Living:  Home Living  Type of Home: Condo  Lives With: Alone  Home Adaptive Equipment: Walker rolling or standard, Cane  Home Layout: One level  Home Access: Stairs to enter with rails  Entrance Stairs-Rails:  (has GB's)  Entrance Stairs-Number of Steps: 2  Bathroom Shower/Tub: Walk-in shower  Bathroom Equipment: Grab bars in shower, Shower chair with back    Prior Level of Function:  Prior Function Per Pt/Caregiver Report  ADL Assistance: Independent  Ambulatory Assistance:  (mod I with cane or walker)  Prior Function Comments: reports a few additional recent falls in the past 6 months    Precautions:  Precautions  Hearing/Visual Limitations: [place], heard better on right  LE Weight Bearing Status: Weight Bearing as Tolerated (left LE)  Medical Precautions: Fall precautions    Vital Signs:     Objective     Pain:  Pain Assessment  Pain Assessment: 0-10  Pain Score:  (reports pain with movement but does not rate)  Pain Type: Acute pain  Pain Location:  (hip and UE)  Pain Orientation: Left    Cognition:  Cognition  Overall Cognitive Status:  (confused at time and forgetful)  Orientation Level: Oriented X4  Processing Speed: Delayed    General Assessments:      Activity Tolerance  Endurance: Tolerates less than 10 min exercise, no significant change in vital signs  Sensation  Sensation Comment: denies numbness and tingling              Static Sitting Balance  Static Sitting-Comment/Number of  Minutes: mod to min A  Dynamic Sitting Balance  Dynamic Sitting-Comments: max A to scoot  Static Standing Balance  Static Standing-Comment/Number of Minutes: max A    Functional Assessments:     Bed Mobility  Bed Mobility: Yes  Bed Mobility 1  Bed Mobility 1: Supine to sitting  Level of Assistance 1: Maximum assistance  Bed Mobility Comments 1: x 2  Bed Mobility 2  Bed Mobility  2: Sitting to supine  Level of Assistance 2: Maximum assistance (x 2 assist)  Bed Mobility Comments 2: Pt reported feeling dizzy and then became less responsive once seated following stand. Right gaze deviation noted. Returned pt to supine and RN came to bedside to assess. Vitals were stable. Pt regained responsiveness in supine.  Transfers  Transfer: Yes  Transfer 1  Transfer From 1: Sit to  Transfer to 1: Stand  Transfer Device 1: Walker  Transfer Level of Assistance 1: Maximum assistance (x 2)  Transfers 2  Transfer From 2: Stand to  Transfer to 2: Sit  Transfer Device 2: Walker  Transfer Level of Assistance 2: Maximum assistance, +2  Ambulation/Gait Training  Ambulation/Gait Training Performed: No        Treatment    Verbal and tactile cues to facilitate abduction/adduction of BLE's and UE placement to transition supine<>sitting EOB. Cues for placing feet flat on floor at EOB and squaring hips to maximize safety. Instruction in ankle pumps and LAQ's BLE seated EOB in order to maximize strength and circulation. Cues for upright posture and gaze sitting EOB. Cues given for proper technique and parameters. Cues for safe hand placement with use of FWW for sit<>stand.        Extremity/Trunk Assessments:        RLE   RLE : Within Functional Limits  LLE   LLE : Exceptions to WFL  AROM LLE (degrees)  LLE AROM Comment: 25-50% throughout  Strength LLE  LLE Overall Strength:  (grossly 2/5 throughout)    Outcome Measures:  [facility] Basic Mobility  Turning from your back to your side while in a flat bed without using bedrails: A lot  Moving from lying  on your back to sitting on the side of a flat bed without using bedrails: A lot  Moving to and from bed to chair (including a wheelchair): A lot  Standing up from a chair using your arms (e.g. wheelchair or bedside chair): A lot  To walk in hospital room: A lot  Climbing 3-5 steps with railing: Total  Basic Mobility - Total Score: 11                            Goals:  Encounter Problems       Encounter Problems (Active)       PT Problem       Pt will perform bed mobility with min A.        Start:  02/29/24    Expected End:  03/14/24            Pt will complete sit <> stand and bed <> chair transfers with min A.        Start:  02/29/24    Expected End:  03/14/24            Pt will ambulate 20 feet min A using FWW with no significant gait deviations.         Start:  02/29/24    Expected End:  03/14/24            Pt will progress to completing 3 x 20 supine/seated exercises in order to increase strength and improve gait mechanics.         Start:  02/29/24    Expected End:  03/14/24                 Education Documentation  Precautions, taught by Verenice Becker PT at 2/29/2024 11:52 AM.  Learner: Patient  Readiness: Acceptance  Method: Explanation  Response: Verbalizes Understanding, Needs Reinforcement  Comment: Cues for proper technique for bed mobility and sit<>stand transfer with FWW, cues for posture    Body Mechanics, taught by Vreenice Becker PT at 2/29/2024 11:52 AM.  Learner: Patient  Readiness: Acceptance  Method: Explanation  Response: Verbalizes Understanding, Needs Reinforcement  Comment: Cues for proper technique for bed mobility and sit<>stand transfer with FWW, cues for posture    Mobility Training, taught by Verenice Becker PT at 2/29/2024 11:52 AM.  Learner: Patient  Readiness: Acceptance  Method: Explanation  Response: Verbalizes Understanding, Needs Reinforcement  Comment: Cues for proper technique for bed mobility and sit<>stand transfer with FWW, cues for posture    Education  Comments  No comments found.

## 2024-02-29 NOTE — PROGRESS NOTES
Occupational Therapy  Evaluation    Patient Name: Meka Shelby  MRN: [id number]  Today's Date: 2/29/2024  Time Calculation  Start Time: 1034  Stop Time: 1100  Time Calculation (min): 26 min    Assessment  IP OT Assessment  OT Assessment: Patient demonstrates decreased independence with self care, decreased independence with functional transfers, decreased endurance, decreased balance and decreased strength.  Patient noted with decreased cognition. Patient below baseline level of function.  Anticipate patient dago benefit from moderate intensity therapy post hospital stay.  Prognosis: Good  Barriers to Discharge: Decreased caregiver support  Evaluation/Treatment Tolerance: Treatment limited secondary to medical complications (Comment) (patient became less responsive and during session. RN aware and assessed.  Patient improved once in supine position)  Medical Staff Made Aware: Yes  End of Session Communication: Bedside nurse  End of Session Patient Position: Bed, 2 rail up, Alarm on    Plan:  Treatment Interventions: ADL retraining, Functional transfer training, UE strengthening/ROM, Endurance training, Cognitive reorientation, Patient/family training  OT Frequency: Daily  OT Discharge Recommendations: Moderate intensity level of continued care  Equipment Recommended upon Discharge: Wheeled walker  OT Recommended Transfer Status: Maximum assist, Assist of 2  OT - OK to Discharge: Yes (to next level of care when medically cleared by physician)    Subjective     Current Problem:  1. Closed displaced intertrochanteric fracture of left femur, initial encounter (CMS/[facility])  Case Request Operating Room: Hip Fracture ORIF w/ Nail Trochanteric    Case Request Operating Room: Hip Fracture ORIF w/ Nail Trochanteric      2. Syncope, unspecified syncope type        3. Elevated troponin        4. First degree AV block        5. Right bundle branch block (RBBB) on electrocardiogram (ECG)        6. Troponin level elevated  Transthoracic  Echo Complete    Transthoracic Echo Complete    CANCELED: Transthoracic Echo Complete    CANCELED: Transthoracic Echo Complete      7. Abnormal electrocardiogram (ECG) (EKG)  Transthoracic Echo Complete    Transthoracic Echo Complete    CANCELED: Transthoracic Echo Complete    CANCELED: Transthoracic Echo Complete          General:  General  Reason for Referral: s/p (L) ORIF with nailing  Referred By: Dr. Combs  Family/Caregiver Present: No  Co-Treatment: PT  Co-Treatment Reason: 2 person assist  Prior to Session Communication: Bedside nurse  Patient Position Received: Bed, 3 rail up, Alarm on  Preferred Learning Style: verbal, visual, auditory  General Comment: Patient agreeable to therapy.    Precautions:  Hearing/Visual Limitations: [place], hears better on right  LE Weight Bearing Status: Weight Bearing as Tolerated  Medical Precautions: Fall precautions    Vital Signs:       Pain:  Pain Assessment  Pain Assessment: 0-10  Pain Score: 0 - No pain (some pain with movement)    Objective     Cognition:  Orientation Level: Oriented X4 (noted patient confused at times and forgetful)        Home Living:  Type of Home: Condo  Lives With: Alone  Home Adaptive Equipment: Walker rolling or standard  Home Layout: One level  Home Access: Stairs to enter with rails  Entrance Stairs-Number of Steps: 2  Bathroom Shower/Tub: Walk-in shower  Bathroom Equipment: Shower chair with back, Grab bars in shower     Prior Function:  Level of [place]: Independent with ADLs and functional transfers    IADL History:       ADL:  LE Dressing Assistance: Total  Toileting Assistance with Device: Total    Activity Tolerance:  Endurance: Tolerates less than 10 min exercise with changes in vital signs    Bed Mobility/Transfers:   Bed Mobility  Bed Mobility: Yes  Bed Mobility 1  Bed Mobility 1: Supine to sitting  Level of Assistance 1: Maximum assistance (2 person assist)  Bed Mobility 2  Bed Mobility  2: Sitting to supine  Level of Assistance  2: Maximum assistance (2 person assist)  Transfers  Transfer: Yes  Transfer 1  Transfer From 1: Sit to  Transfer to 1: Stand  Technique 1: Sit to stand, Stand to sit  Transfer Device 1: Walker  Transfer Level of Assistance 1: Maximum assistance, +2  Trials/Comments 1: Patient became less responsive with right gaze once seated from stand.  Returned patient to supine and RN alerted.  BP was stable.      Sitting Balance:  Static Sitting Balance  Static Sitting-Level of Assistance: Minimum assistance         Extremities: RUE   RUE : Within Functional Limits and LUE   LUE:  (4/5)    Outcome Measures: [facility] Daily Activity  Putting on and taking off regular lower body clothing: Total  Bathing (including washing, rinsing, drying): Total  Putting on and taking off regular upper body clothing: A lot  Toileting, which includes using toilet, bedpan or urinal: Total  Taking care of personal grooming such as brushing teeth: A little  Eating Meals: A little  Daily Activity - Total Score: 11           EDUCATION:  Education  Individual(s) Educated: Patient  Education Provided: Fall precautons, Risk and benefits of OT discussed with patient or other  Plan of Care Discussed and Agreed Upon: yes  Patient Response to Education: Patient/Caregiver Verbalized Understanding of Information      Goals:   Encounter Problems       Encounter Problems (Active)       OT Goals       Patient will complete functional transfers with min A. (Progressing)       Start:  02/29/24    Expected End:  03/14/24            Patient will complete toileting with min A. (Progressing)       Start:  02/29/24    Expected End:  03/14/24            Patient will complete LE dressing with min A. (Progressing)       Start:  02/29/24    Expected End:  03/14/24

## 2024-02-29 NOTE — NURSING NOTE
This pt became light headed and dizzy when working with PT/OT this morning. Patient is currently back in bed and stating that she feels dizzy. VS are stable. This nurse informed provider Max and is awaiting further orders at this time.   PT told this nurse that pt's vision deviated to the right side. Neuro check was completed by this RN and is stable with no deficits. Provider states he will be up soon to assess this pt.

## 2024-02-29 NOTE — CARE PLAN
Problem: Pain  Goal: Takes deep breaths with improved pain control throughout the shift  Outcome: Progressing  Goal: Turns in bed with improved pain control throughout the shift  Outcome: Progressing  Goal: Walks with improved pain control throughout the shift  Outcome: Progressing  Goal: Performs ADL's with improved pain control throughout shift  Outcome: Progressing  Goal: Participates in PT with improved pain control throughout the shift  Outcome: Progressing  Goal: Free from opioid side effects throughout the shift  Outcome: Progressing  Goal: Free from acute confusion related to pain meds throughout the shift  Outcome: Progressing     Problem: Respiratory  Goal: Clear secretions with interventions this shift  Outcome: Progressing  Goal: Minimize anxiety/maximize coping throughout shift  Outcome: Progressing  Goal: Minimal/no exertional discomfort or dyspnea this shift  Outcome: Progressing  Goal: No signs of respiratory distress (eg. Use of accessory muscles. Peds grunting)  Outcome: Progressing  Goal: Patent airway maintained this shift  Outcome: Progressing  Goal: Tolerate mechanical ventilation evidenced by VS/agitation level this shift  Outcome: Progressing  Goal: Tolerate pulmonary toileting this shift  Outcome: Progressing  Goal: Verbalize decreased shortness of breath this shift  Outcome: Progressing  Goal: Wean oxygen to maintain O2 saturation per order/standard this shift  Outcome: Progressing  Goal: Increase self care and/or family involvement in next 24 hours  Outcome: Progressing     Problem: Fall/Injury  Goal: Not fall by end of shift  Outcome: Progressing  Goal: Be free from injury by end of the shift  Outcome: Progressing  Goal: Verbalize understanding of personal risk factors for fall in the hospital  Outcome: Progressing  Goal: Verbalize understanding of risk factor reduction measures to prevent injury from fall in the home  Outcome: Progressing  Goal: Use assistive devices by end of the  shift  Outcome: Progressing  Goal: Pace activities to prevent fatigue by end of the shift  Outcome: Progressing     Problem: Skin  Goal: Decreased wound size/increased tissue granulation at next dressing change  Outcome: Progressing  Flowsheets (Taken 2/29/2024 0528)  Decreased wound size/increased tissue granulation at next dressing change: Promote sleep for wound healing  Goal: Participates in plan/prevention/treatment measures  Outcome: Progressing  Flowsheets (Taken 2/29/2024 0528)  Participates in plan/prevention/treatment measures: Elevate heels  Goal: Prevent/manage excess moisture  Outcome: Progressing  Flowsheets (Taken 2/29/2024 0528)  Prevent/manage excess moisture: Moisturize dry skin  Goal: Prevent/minimize sheer/friction injuries  Outcome: Progressing  Flowsheets (Taken 2/28/2024 0419)  Prevent/minimize sheer/friction injuries: Use pull sheet  Goal: Promote/optimize nutrition  Outcome: Progressing  Flowsheets (Taken 2/29/2024 0528)  Promote/optimize nutrition: Offer water/supplements/favorite foods  Goal: Promote skin healing  Outcome: Progressing  Flowsheets (Taken 2/29/2024 0528)  Promote skin healing: Assess skin/pad under line(s)/device(s)     Problem: Pain - Adult  Goal: Verbalizes/displays adequate comfort level or baseline comfort level  Outcome: Progressing     Problem: Safety - Adult  Goal: Free from fall injury  Outcome: Progressing     Problem: Discharge Planning  Goal: Discharge to home or other facility with appropriate resources  Outcome: Progressing     Problem: Chronic Conditions and Co-morbidities  Goal: Patient's chronic conditions and co-morbidity symptoms are monitored and maintained or improved  Outcome: Progressing   The patient's goals for the shift include      The clinical goals for the shift include Pt will have minimal pain this shift and receive adequate rest.    Over the shift, the patient did have minimal pain and received adequate rest.

## 2024-02-29 NOTE — PROGRESS NOTES
"Meka Shelby is a 83 y.o. female on day 1 of admission presenting with Closed displaced intertrochanteric fracture of left femur, initial encounter (CMS/[facility]).    Subjective   Patient was taken to surgery earlier and tolerated without incidence, she was at the bedside with her daughter to their on the orthopedic floor after coming back from PACU, she had no complaints of pain or discomfort as she was still recovering from anesthesia although she tolerated the surgery well.  Discussed the results of the echocardiogram with the patient and her daughter, they had a few questions, but otherwise because she was feeling well and the surgery went well they were satisfied; the patient had some minor irritation in the back of her throat from the endotracheal tube/anesthesia, otherwise had no issues with swallowing and was ordering dinner and looking forward to eating something hot.  No fevers or chills or any other complaints other than mentioned above.    Objective     Physical Exam    Last Recorded Vitals  Blood pressure 153/88, pulse 64, temperature 36 °C (96.8 °F), temperature source Temporal, resp. rate 16, height 1.575 m (5' 2\"), weight 59 kg (130 lb), SpO2 99 %.  Intake/Output last 3 Shifts:  I/O last 3 completed shifts:  In: 1100 (18.7 mL/kg) [I.V.:1100 (18.7 mL/kg)]  Out: 2405 (40.8 mL/kg) [Urine:2380 (1.1 mL/kg/hr); Blood:25]  Weight: 59 kg     Relevant Results  Scheduled medications  acetaminophen, 650 mg, oral, q6h OLIVIA  amLODIPine, 2.5 mg, oral, Daily  ceFAZolin, 2 g, intravenous, q8h  cholecalciferol, 2,000 Units, oral, Daily  enoxaparin, 40 mg, subcutaneous, Daily  famotidine, 20 mg, oral, Nightly  hydroxychloroquine, 200 mg, oral, BID  latanoprost, 1 drop, Both Eyes, Nightly  levothyroxine, 112 mcg, oral, Daily before breakfast  lisinopril, 20 mg, oral, Daily  sennosides, 2 tablet, oral, BID  simvastatin, 10 mg, oral, Nightly      Continuous medications  oxygen, 2 L/min, Last Rate: Stopped (02/28/24 " 1630)      PRN medications  PRN medications: benzocaine-menthol, bisacodyl, diphenhydrAMINE, hydrALAZINE, HYDROmorphone, naloxone, naloxone, ondansetron ODT **OR** ondansetron, oxyCODONE, oxyCODONE  Results for orders placed or performed during the hospital encounter of 02/27/24 (from the past 24 hour(s))   Creatine Kinase   Result Value Ref Range    Creatine Kinase 306 (H) 0 - 215 U/L   ECG 12 Lead   Result Value Ref Range    Ventricular Rate 65 BPM    Atrial Rate 65 BPM    ME Interval 282 ms    QRS Duration 132 ms    QT Interval 446 ms    QTC Calculation(Bazett) 463 ms    R Axis 246 degrees    T Axis 77 degrees    QRS Count 11 beats    Q Onset 206 ms    P Onset 65 ms    P Offset 114 ms    T Offset 429 ms    QTC Fredericia 457 ms   CBC   Result Value Ref Range    WBC 8.9 4.4 - 11.3 x10*3/uL    nRBC 0.0 0.0 - 0.0 /100 WBCs    RBC 3.94 (L) 4.00 - 5.20 x10*6/uL    Hemoglobin 11.5 (L) 12.0 - 16.0 g/dL    Hematocrit 34.8 (L) 36.0 - 46.0 %    MCV 88 80 - 100 fL    MCH 29.2 26.0 - 34.0 pg    MCHC 33.0 32.0 - 36.0 g/dL    RDW 13.0 11.5 - 14.5 %    Platelets 185 150 - 450 x10*3/uL   Magnesium   Result Value Ref Range    Magnesium 1.71 1.60 - 2.40 mg/dL   Basic Metabolic Panel   Result Value Ref Range    Glucose 101 (H) 74 - 99 mg/dL    Sodium 130 (L) 136 - 145 mmol/L    Potassium 3.7 3.5 - 5.3 mmol/L    Chloride 98 98 - 107 mmol/L    Bicarbonate 24 21 - 32 mmol/L    Anion Gap 12 10 - 20 mmol/L    Urea Nitrogen 14 6 - 23 mg/dL    Creatinine 0.45 (L) 0.50 - 1.05 mg/dL    eGFR >90 >60 mL/min/1.73m*2    Calcium 8.3 (L) 8.6 - 10.3 mg/dL   Protime-INR   Result Value Ref Range    Protime 12.3 9.8 - 12.8 seconds    INR 1.1 0.9 - 1.1   Troponin I, High Sensitivity   Result Value Ref Range    Troponin I, High Sensitivity 252 (HH) 0 - 13 ng/L   Lipid Panel   Result Value Ref Range    Cholesterol 136 0 - 199 mg/dL    HDL-Cholesterol 53.1 mg/dL    Cholesterol/HDL Ratio 2.6     LDL Calculated 64 <=99 mg/dL    VLDL 19 0 - 40 mg/dL     Triglycerides 97 0 - 149 mg/dL    Non HDL Cholesterol 83 0 - 149 mg/dL   TSH with reflex to Free T4 if abnormal   Result Value Ref Range    Thyroid Stimulating Hormone 0.25 (L) 0.44 - 3.98 mIU/L   Thyroxine, Free   Result Value Ref Range    Thyroxine, Free 1.31 (H) 0.61 - 1.12 ng/dL   Transthoracic Echo Complete   Result Value Ref Range    AV pk neel 1.22 m/s    LV biplane EF 48 %    LVOT diam 2.29 cm    MV E/A ratio 0.64     MV avg E/e' ratio 5.20     Tricuspid annular plane systolic excursion 1.7 cm    LA vol index A/L 25.5 ml/m2    LVIDd 4.35 cm    Aortic Valve Area by Continuity of Peak Velocity 2.76 cm2    AV pk grad 5.9 mmHg    LV A4C EF 48.5    Basic Metabolic Panel   Result Value Ref Range    Glucose 111 (H) 74 - 99 mg/dL    Sodium 130 (L) 136 - 145 mmol/L    Potassium 3.8 3.5 - 5.3 mmol/L    Chloride 99 98 - 107 mmol/L    Bicarbonate 23 21 - 32 mmol/L    Anion Gap 12 10 - 20 mmol/L    Urea Nitrogen 14 6 - 23 mg/dL    Creatinine 0.51 0.50 - 1.05 mg/dL    eGFR >90 >60 mL/min/1.73m*2    Calcium 8.3 (L) 8.6 - 10.3 mg/dL     Transthoracic Echo Complete    Result Date: 2/28/2024            [facility] [id number] [address] [facility], [facility] [address]    Tel [phone] Fax [phone] TRANSTHORACIC ECHOCARDIOGRAM REPORT  Patient Name:      JESSICA Holley Physician:    [id number] Trevon Fuller MD Study Date:        2/28/2024             Ordering Provider:    [id number] MADHU MADDOX MRN/PID:           [id number]              Fellow: Accession#:        [id number]          Nurse: Date of Birth/Age: 1940 / 83 years  Sonographer:          Keyana Ortiz RDCS Gender:            F                     Additional Staff: Height:            157.48 cm             Admit Date: Weight:            58.97 kg               Admission Status:     Inpatient -                                                                Routine BSA / BMI:         1.59 m2 / 23.78 kg/m2 Department Location:  [facility] Echo Lab Blood Pressure: 154 /79 mmHg Study Type:    TRANSTHORACIC ECHO (TTE) COMPLETE Diagnosis/ICD: Elevated Troponin-R79.89 Indication:    elevated troponin  Study Detail: The following Echo studies were performed: 2D, M-Mode, Doppler and               color flow. Technically challenging study due to patient lying in               supine position.  PHYSICIAN INTERPRETATION: Left Ventricle: Left ventricular systolic function is mildly decreased. Wall motion is abnormal. The left ventricular cavity size is upper limits of normal. There is mild concentric left ventricular hypertrophy. Spectral Doppler shows an impaired relaxation pattern of left ventricular diastolic filling. Left Atrium: The left atrium is mildly dilated. Right Ventricle: The right ventricle is normal in size. There is normal right ventricular global systolic function. Right Atrium: The right atrium is normal in size. Aortic Valve: The aortic valve appears structurally normal. There is mild aortic valve cusp calcification. There is trivial aortic valve regurgitation. The peak instantaneous gradient of the aortic valve is 5.9 mmHg. Mitral Valve: The mitral valve is normal in structure. There is mild mitral annular calcification. There is mild mitral valve regurgitation. Tricuspid Valve: The tricuspid valve is structurally normal. There is mild tricuspid regurgitation. Pulmonic Valve: The pulmonic valve was not assessed. The pulmonic valve regurgitation was not assessed. Pericardium: There is no pericardial effusion noted. Aorta: The aortic root is normal.  CONCLUSIONS:  1. Left ventricular systolic function is mildly decreased.  2. Spectral Doppler shows an impaired relaxation pattern of left ventricular diastolic filling.  3. Mild LVH mildly dilated LV with EF of 45-50% and  hypokinesis more prominent in the apex.  4. Mildly dilated atria with mild mitral regurgitation. QUANTITATIVE DATA SUMMARY: 2D MEASUREMENTS:                           Normal Ranges: LAs:           3.75 cm    (2.7-4.0cm) IVSd:          1.38 cm    (0.6-1.1cm) LVPWd:         1.28 cm    (0.6-1.1cm) LVIDd:         4.35 cm    (3.9-5.9cm) LVIDs:         3.25 cm LV Mass Index: 137.3 g/m2 LV % FS        25.3 % LA VOLUME:                              Normal Ranges: LA Vol A4C:       33.7 ml    (22+/-6mL/m2) LA Vol A2C:       46.1 ml LA Vol BP:        40.6 ml LA Vol Index A4C: 21.2 ml/m2 LA Vol Index A2C: 28.9 ml/m2 LA Vol Index BP:  25.5 ml/m2 LA Volume Index:  26.0 ml/m2 LA Vol A4C:       32.7 ml LA Vol A2C:       44.4 ml M-MODE MEASUREMENTS:                  Normal Ranges: AoV Exc: 1.78 cm (1.5-2.5cm) LAs:     3.81 cm (2.7-4.0cm) AORTA MEASUREMENTS:                  Normal Ranges: AoV Exc: 1.78 cm (1.5-2.5cm) LV SYSTOLIC FUNCTION BY 2D PLANIMETRY (MOD):                     Normal Ranges: EF-A4C View: 48.5 % (>=55%) EF-A2C View: 46.7 % EF-Biplane:  48.3 % LV DIASTOLIC FUNCTION:                      Normal Ranges: MV Peak E:  0.70 m/s (0.7-1.2 m/s) MV Peak A:  1.10 m/s (0.42-0.7 m/s) E/A Ratio:  0.64     (1.0-2.2) MV e'       0.14 m/s (>8.0) E/e' Ratio: 5.20     (<8.0) MITRAL VALVE:                 Normal Ranges: MV DT: 184 msec (150-240msec) AORTIC VALVE:                         Normal Ranges: AoV Vmax:      1.22 m/s (<=1.7m/s) AoV Peak P.9 mmHg (<20mmHg) LVOT Max Aleks:  0.81 m/s (<=1.1m/s) LVOT Diameter: 2.29 cm  (1.8-2.4cm) AoV Area,Vmax: 2.76 cm2 (2.5-4.5cm2)  RIGHT VENTRICLE: RV Basal 3.20 cm RV Mid   2.70 cm RV Major 6.8 cm TAPSE:   17.0 mm AORTA: Asc Ao Diam 3.36 cm  [id number] Trevon Fuller MD Electronically signed on 2024 at 4:17:02 PM  ** Final **     FL fluoro images no charge    Result Date: 2024  These images are not reportable by radiology and will not be interpreted by  Radiologists.    ECG 12  Lead    Result Date: 2/28/2024  Sinus rhythm with 1st degree AV block Right bundle branch block Anterolateral infarct , age undetermined Abnormal ECG When compared with ECG of 27-FEB-2024 15:07, (unconfirmed) QRS axis Shifted left Criteria for Inferior infarct are no longer Present Confirmed by Trevon Fuller ([id number]) on 2/28/2024 10:48:21 AM    CT hip left wo IV contrast    Result Date: 2/28/2024  Interpreted By:  Celestina Moreira, STUDY: CT HIP LEFT WO IV CONTRAST;  2/28/2024 9:18 am   INDICATION: Signs/Symptoms:Left IT fracture.   COMPARISON: Pelvis left hip radiographs dated 02/27/2024   ACCESSION NUMBER(S): [id number]   ORDERING CLINICIAN: MADHU MADDOX   TECHNIQUE: CT imaging of the  left lower extremity was obtained  without administration of intravenous contrast medium. Coronal and sagittal reformatted images were performed. 3D reformatted images were created and reviewed.   FINDINGS: OSSEOUS STRUCTURES: There is acute comminuted fracture through intertrochanteric region of left femur. There is significant displacement with impaction of fracture fragments. There is proximal and lateral displacement of the distal fracture fragment with a proximally 1.5 cm overriding of fracture fragments. There is also external rotation of the distal fracture fragment there is anterior and lateral angulation of the fracture apex. There is also another dominant transverse fracture line extending through base of greater trochanter with mild superior displacement of fracture fragments. No definite evidence of extension of fracture lucency to the femoral head articular surface. The left femoroacetabular joint articulation is maintained without evidence of subluxation or dislocation. The rest of the visualized proximal left femoral diaphysis is intact. Visualized left iliac bone is intact. No additional fractures are noted through the acetabulum. There are mild degenerative changes in the left hip joint with joint space  narrowing and subchondral cystic changes in the acetabulum. Scratch 3rd   SOFT TISSUES: There is moderate amount of intramedullary hemorrhage about the fracture site. There is significant muscular edema in the adductor compartment musculature and also extending along the medial aspect of the proximal thigh. Otherwise no large fluid collection or hematoma is noted within limits of noncontrast technique. There is ill-defined reticular subcutaneous soft tissue edema along the lateral aspect of the proximal left thigh, likely favored to be related to soft tissue contusion.       1. Acute comminuted and significantly impacted fracture through intertrochanteric region of proximal left femur with significant displacement of fracture fragments as described above. 2. Mild left hip arthrosis. 3. Posttraumatic soft tissue changes in the medial compartment of the thigh as described above.       MACRO: None   Signed by: Celestina Moreira 2/28/2024 10:12 AM Dictation workstation:   [id number]    ECG 12 lead    Result Date: 2/28/2024  Sinus rhythm with 1st degree AV block Possible Left atrial enlargement Left axis deviation Right bundle branch block Inferior infarct , age undetermined Abnormal ECG When compared with ECG of 27-FEB-2024 13:36, (unconfirmed) Criteria for Anteroseptal infarct are no longer Present No significant change was found    CT cervical spine wo IV contrast    Result Date: 2/27/2024  Interpreted By:  Angélica Jansen, STUDY: CT CERVICAL SPINE WO IV CONTRAST;  2/27/2024 2:09 pm   INDICATION: Signs/Symptoms:fall.   COMPARISON: None.   ACCESSION NUMBER(S): [id number]   ORDERING CLINICIAN: RAZ ZURITA   TECHNIQUE: Thin section axial images were obtained from the skull base down through the thoracic inlet. Sagittal and coronal reconstruction images were generated. Soft tissue, lung, and bone windows were reviewed.   FINDINGS: VERTEBRAL BODIES AND POSTERIOR ELEMENTS: No cervical spine compression fracture.  No  posterior element fracture.  No destructive bone lesion. No traumatic listhesis.   SPINAL CANAL/NEURAL FORAMINA: Multilevel spondylosis, most pronounced at C6-C7 with moderate to severe spinal canal stenosis and severe bilateral neural foraminal narrowing due to prominent posterior disc osteophyte complex.   NECK SOFT TISSUES: No acute abnormalities.   LUNG APICES: Clear.   SKULL BASE: No acute abnormalities.       No acute fracture or traumatic malalignment.   Multilevel spondylosis, most pronounced at C6-C7 with moderate to severe spinal canal stenosis and severe bilateral neural foraminal narrowing.   Signed by: Angélica Jansen 2/27/2024 2:40 PM Dictation workstation:   [id number]    ECG 12 lead    Result Date: 2/27/2024  Sinus rhythm with 1st degree AV block Possible Left atrial enlargement Left axis deviation Right bundle branch block Anteroseptal infarct , age undetermined Abnormal ECG No previous ECGs available    CT head wo IV contrast    Result Date: 2/27/2024  Interpreted By:  Angélica Jansen, STUDY: CT HEAD WO IV CONTRAST;  2/27/2024 2:09 pm   INDICATION: Signs/Symptoms:fall.   COMPARISON: None.   ACCESSION NUMBER(S): [id number]   ORDERING CLINICIAN: RAZ ZURITA   TECHNIQUE: Noncontrast axial CT scan of head was performed.   FINDINGS: Parenchyma: There is no intracranial hemorrhage. The grey-white differentiation is intact. There is no mass effect or midline shift. Patchy periventricular white matter hypodensities, likely moderate chronic microvascular ischemic change.   CSF Spaces: The ventricles, sulci and basal cisterns are within normal limits for age.   Extra-Axial Fluid: There is no extraaxial fluid collection.   Calvarium: No acute fracture.   Paranasal sinuses: Visualized paranasal sinuses are clear.   Mastoids: Clear.   Orbits: Normal.   Soft tissues: Unremarkable.       No acute intracranial abnormality or calvarial fracture.   MACRO None   Signed by: Angélica Jansen 2/27/2024 2:36 PM  Dictation workstation:   [id number]    XR hip left with pelvis when performed 2 or 3 views    Result Date: 2/27/2024  Interpreted By:  Qamar Marcum, STUDY: XR HIP LEFT WITH PELVIS WHEN PERFORMED 2 OR 3 VIEWS;  2/27/2024 12:52 pm   INDICATION: Signs/Symptoms:fall.   COMPARISON: None.   ACCESSION NUMBER(S): [id number]   ORDERING CLINICIAN: RAZ ZURITA   TECHNIQUE: AP view pelvis and 2 views  of the  left hip were obtained.   FINDINGS: Osteopenic bones. Multilevel mid to distal lumbar spine disc space narrowing with endplate osteophytosis. Sclerotic arthritic changes in both SI joints. Mild bilateral hip joint space narrowing. Prominent aortoiliac and femoral artery calcifications. Flocculent rounded central pelvic calcification measuring 19 mm in diameter, most likely a calcified uterine fibroid. No lytic or blastic destructive bone lesion. There is an acute comminuted displaced intertrochanteric fracture of the proximal left femur. There is mild superior displacement of the femoral fragment relative to the neck fragment, with mild impaction of fragments. Also medial displacement of the lesser trochanteric fragments and posterior displacement of the greater trochanteric fragments. No femoral head involvement. No dislocation. No opaque soft tissue foreign body. No periosteal reaction or erosion.       Acute comminuted impacted and angulated fracture through the intertrochanteric proximal left femur as described.   MACRO: None   Signed by: Qamar Marcum 2/27/2024 1:14 PM Dictation workstation:   [id number]    XR chest 1 view    Result Date: 2/27/2024  Interpreted By:  Qamar Marcum, STUDY: XR CHEST 1 VIEW;  2/27/2024 12:52 pm   INDICATION: Signs/Symptoms:Chest Pain.   COMPARISON: None.   ACCESSION NUMBER(S): [id number]   ORDERING CLINICIAN: RAZ ZURITA   TECHNIQUE: Single AP portable view of the chest was obtained.   FINDINGS: MEDIASTINUM/ LUNGS/ [place]: Suboptimal level of inspiration. Elevation of the right  diaphragm. Cardiomegaly without vascular congestion or pleural effusion. The No abnormal opacity in either lung worrisome for tumor or pneumonia. No pneumothorax. No tracheal deviation. No abnormal hilar fullness or gross mass on either side.   BONES: No lytic or blastic destructive bone lesion.   UPPER ABDOMEN: Grossly intact.       Hypoventilatory exam. Elevation of the right diaphragm.   Mild cardiomegaly.  Currently without radiographic evidence of CHF or pneumonia.   MACRO: None   Signed by: Qamar Marcum 2/27/2024 1:13 PM Dictation workstation:   [id number]       Assessment/Plan   Principal Problem:    Closed displaced intertrochanteric fracture of left femur, initial encounter (CMS/[facility])  Active Problems:    Hyponatremia    Hypertensive emergency    Hypertension    Osteopenia    SLE (systemic lupus erythematosus related syndrome) (CMS/[facility])    Troponin level elevated    Hyperlipidemia    DDD (degenerative disc disease), cervical    Lichen planus    Hypothyroidism    Scoliosis    Plan:  - Patient stable to remain at current level of care on telemetry  - Continue home medications as previously ordered  - Follow-up with cardiology, she will stop on fluid resuscitation and likely could use some mild diuresis starting tomorrow  - Will hold off on trending troponins as she is not having any chest pain  - Will check BNP in the morning  - Low-sodium diet  - Full code  - VTE prophylaxis per orthopedics           I spent 45 minutes in the professional and overall care of this patient.      Heron Santana MD

## 2024-02-29 NOTE — PROGRESS NOTES
02/29/24 1106   Discharge Planning   Living Arrangements Alone   Support Systems Children   Type of Residence Private residence   Home or Post Acute Services Post acute facilities (Rehab/SNF/etc)   Type of Post Acute Facility Services Skilled nursing   Patient expects to be discharged to: [facility] SNF   Patient Choice   Provider Choice list and CMS website (https://medicare.gov/care-compare#search) for post-acute Quality and Resource Measure Data were provided and reviewed with: Patient     Awaiting therapy evals to start precert for [facility] SNF. Therapy notified.   [facility]  12[facility] accept patient. Requested direct precert team start auth.

## 2024-02-29 NOTE — PROGRESS NOTES
"Meka Shelby is a 83 y.o. female on day 2 of admission presenting with Closed displaced intertrochanteric fracture of left femur, initial encounter (CMS/[facility]).    Subjective   Ms. Shelby tells me that she is feeling better this morning. She describes her left hip pain as mild.        Objective     Physical Exam  Vitals reviewed.   HENT:      Head: Normocephalic.      Mouth/Throat:      Mouth: Mucous membranes are moist.      Pharynx: Oropharynx is clear.   Eyes:      Extraocular Movements: Extraocular movements intact.   Cardiovascular:      Rate and Rhythm: Normal rate.   Pulmonary:      Effort: Pulmonary effort is normal.   Abdominal:      Palpations: Abdomen is soft.   Musculoskeletal:      Comments: Left hip dressing is dry and intact.  light touch sensation is intact, ankle dorsiflexion/plantar flexion is intact. DP 2+/2 palpable     Skin:     General: Skin is warm and dry.      Capillary Refill: Capillary refill takes less than 2 seconds.   Neurological:      General: No focal deficit present.      Mental Status: She is alert. Mental status is at baseline.   Psychiatric:         Mood and Affect: Mood normal.         Last Recorded Vitals  Blood pressure 147/64, pulse 63, temperature 36 °C (96.8 °F), temperature source Temporal, resp. rate 16, height 1.575 m (5' 2\"), weight 59 kg (130 lb), SpO2 94 %.  Intake/Output last 3 Shifts:  I/O last 3 completed shifts:  In: 1100 (18.7 mL/kg) [I.V.:1100 (18.7 mL/kg)]  Out: 1780 (30.2 mL/kg) [Urine:1755 (0.8 mL/kg/hr); Blood:25]  Weight: 59 kg     Relevant Results      Scheduled medications  acetaminophen, 650 mg, oral, q6h OLIVIA  amLODIPine, 2.5 mg, oral, Daily  aspirin, 81 mg, oral, Daily  cholecalciferol, 2,000 Units, oral, Daily  enoxaparin, 40 mg, subcutaneous, Daily  famotidine, 20 mg, oral, Nightly  hydroxychloroquine, 200 mg, oral, BID  isosorbide mononitrate ER, 15 mg, oral, Daily  latanoprost, 1 drop, Both Eyes, Nightly  levothyroxine, 112 mcg, oral, Daily before " breakfast  lisinopril, 20 mg, oral, Daily  sennosides, 2 tablet, oral, BID  simvastatin, 10 mg, oral, Nightly      Continuous medications  oxygen, 2 L/min, Last Rate: Stopped (02/28/24 1630)      PRN medications  PRN medications: benzocaine-menthol, bisacodyl, diphenhydrAMINE, hydrALAZINE, HYDROmorphone, naloxone, naloxone, ondansetron ODT **OR** ondansetron, oxyCODONE, oxyCODONE  Results for orders placed or performed during the hospital encounter of 02/27/24 (from the past 24 hour(s))   Basic Metabolic Panel   Result Value Ref Range    Glucose 111 (H) 74 - 99 mg/dL    Sodium 130 (L) 136 - 145 mmol/L    Potassium 3.8 3.5 - 5.3 mmol/L    Chloride 99 98 - 107 mmol/L    Bicarbonate 23 21 - 32 mmol/L    Anion Gap 12 10 - 20 mmol/L    Urea Nitrogen 14 6 - 23 mg/dL    Creatinine 0.51 0.50 - 1.05 mg/dL    eGFR >90 >60 mL/min/1.73m*2    Calcium 8.3 (L) 8.6 - 10.3 mg/dL   CBC   Result Value Ref Range    WBC 9.1 4.4 - 11.3 x10*3/uL    nRBC 0.0 0.0 - 0.0 /100 WBCs    RBC 3.43 (L) 4.00 - 5.20 x10*6/uL    Hemoglobin 10.2 (L) 12.0 - 16.0 g/dL    Hematocrit 30.9 (L) 36.0 - 46.0 %    MCV 90 80 - 100 fL    MCH 29.7 26.0 - 34.0 pg    MCHC 33.0 32.0 - 36.0 g/dL    RDW 13.2 11.5 - 14.5 %    Platelets 165 150 - 450 x10*3/uL   Magnesium   Result Value Ref Range    Magnesium 1.88 1.60 - 2.40 mg/dL   Basic Metabolic Panel   Result Value Ref Range    Glucose 100 (H) 74 - 99 mg/dL    Sodium 131 (L) 136 - 145 mmol/L    Potassium 4.1 3.5 - 5.3 mmol/L    Chloride 100 98 - 107 mmol/L    Bicarbonate 24 21 - 32 mmol/L    Anion Gap 11 10 - 20 mmol/L    Urea Nitrogen 13 6 - 23 mg/dL    Creatinine 0.62 0.50 - 1.05 mg/dL    eGFR 89 >60 mL/min/1.73m*2    Calcium 8.2 (L) 8.6 - 10.3 mg/dL               This patient has a urinary catheter   Reason for the urinary catheter remaining today? perioperative use for selected surgical procedures  Ok for troncoso removal from ortho standpoint               Assessment/Plan   Principal Problem:    Closed displaced  intertrochanteric fracture of left femur, initial encounter (CMS/[facility])  Active Problems:    Hypertension    Hyperlipidemia    Osteopenia    SLE (systemic lupus erythematosus related syndrome) (CMS/[facility])    DDD (degenerative disc disease), cervical    Lichen planus    Hypothyroidism    Scoliosis    Hyponatremia    Troponin level elevated    Hypertensive emergency  Left IT femur fracture  POD #1 s/p left TFN  Continue PT/OT, WBAT left   LE  Using incentive spirometer   Reviewed am labs  Multimodal pain regimen  Surgical hip dressing to be removed on post op day #7  VTE prophylaxis: lovenox daily X 30 days  Dispo: to be determined after PT eval  Follow up with Dr. Combs in 2 weeks    2. hyponatremia   Sodium slowly improving, now 131, from 130  No neuro deficits noted  Will defer management to hospitalist team         I spent 25 minutes in the professional and overall care of this patient.      Rosangela Castillo PA-C

## 2024-02-29 NOTE — PROGRESS NOTES
CARDIOLOGY SERVICE   CONSULT Progress note   CVM Dr. Trevon Fuller  [facility] [place]    PATIENT NAME: Meka Shelby  PATIENT MRN: [id number]  SERVICE DATE:  2/29/2024  SERVICE TIME: 7:45 AM    CONSULTANT: Trevon Fuller MD  PRIMARY CARE PHYSICIAN: No primary care provider on file.  ATTENDING PHYSICIAN: Heron Santana MD      IMPRESSIONS:  Mechanical fall resulting in left femur fracture underwent surgery 2/28/2024  Elevated cardiac markers , likely type II myocardial injury  Mild systolic dysfunction EF 45-50% with apical hypokinesis by echocardiogram 2/28/2024  RBBB 132,   Hypertension  Hyperlipidemia LDL 64 in 2/2024, at home on statin  Thyroid disease  Lichen planus, SLE, scoliosis and osteopenia cervical arthritis  Card meds PTA: Lisinopril 20 daily, Norvasc 2.5 daily, simvastatin 20    RECOMMENDATIONS:  Your management of thyroid disease  Add aspirin  Add low-dose Imdur  EKG   Prolonged ME and RBBB are in the way of BB  Telemetry  Outpatient cardiology follow-up discussed    SIGNATURE: Trevon Fuller MD   OFFICE: [phone]    ================================================================    SUBJECTIVE: Comfortable    CURRENT CARDIOVASCULAR MEDICATIONS:  No current facility-administered medications on file prior to encounter.     Current Outpatient Medications on File Prior to Encounter   Medication Sig Dispense Refill    amLODIPine (Norvasc) 2.5 mg tablet Take 1 tablet (2.5 mg) by mouth once daily.      cholecalciferol (Vitamin D-3) 50 MCG (2000 UT) tablet Take 1 tablet (50 mcg) by mouth once daily.      famotidine (Pepcid) 20 mg tablet Take 1 tablet (20 mg) by mouth once daily at bedtime.      hydroxychloroquine (Plaquenil) 200 mg tablet Take 1 tablet (200 mg) by mouth 2 times a day.      levothyroxine (Synthroid) 112 mcg tablet Take 1 tablet (112 mcg) by mouth once daily in the morning. Take before meals.      lisinopril 20 mg tablet Take 1 tablet (20 mg) by mouth once daily.      nabumetone (Relafen)  "500 mg tablet Take 2 tablets (1,000 mg) by mouth once daily.      simvastatin (Zocor) 20 mg tablet Take 0.5 tablets (10 mg) by mouth once daily at bedtime.      travoprost (Travatan Z) 0.004 % drops ophthalmic solution Administer 1 drop into both eyes once daily at bedtime.          PHYSICAL EXAM:  /86 (BP Location: Left arm, Patient Position: Sitting)   Pulse 63   Temp 36.1 °C (97 °F) (Temporal)   Resp 18   Ht 1.575 m (5' 2\")   Wt 59 kg (130 lb)   SpO2 93%   BMI 23.78 kg/m²   Awake Alert   Neck: No JVD  Cardiac:  S1 S2  Resp: Clear  Ext: No peripheral edema    LABS:  Lab Results   Component Value Date    WBC 9.1 02/29/2024    HGB 10.2 (L) 02/29/2024    HCT 30.9 (L) 02/29/2024    MCV 90 02/29/2024     02/29/2024      Lab Results   Component Value Date    GLUCOSE 100 (H) 02/29/2024    CALCIUM 8.2 (L) 02/29/2024     (L) 02/29/2024    K 4.1 02/29/2024    CO2 24 02/29/2024     02/29/2024    BUN 13 02/29/2024    CREATININE 0.62 02/29/2024          "

## 2024-03-01 ENCOUNTER — PHARMACY VISIT (OUTPATIENT)
Dept: PHARMACY | Facility: CLINIC | Age: 84
End: 2024-03-01
Payer: COMMERCIAL

## 2024-03-01 VITALS
OXYGEN SATURATION: 95 % | TEMPERATURE: 97.2 F | HEART RATE: 60 BPM | WEIGHT: 130 LBS | RESPIRATION RATE: 18 BRPM | BODY MASS INDEX: 23.92 KG/M2 | DIASTOLIC BLOOD PRESSURE: 64 MMHG | HEIGHT: 62 IN | SYSTOLIC BLOOD PRESSURE: 138 MMHG

## 2024-03-01 LAB
ANION GAP SERPL CALC-SCNC: 10 MMOL/L (ref 10–20)
BUN SERPL-MCNC: 22 MG/DL (ref 6–23)
CALCIUM SERPL-MCNC: 8.2 MG/DL (ref 8.6–10.3)
CHLORIDE SERPL-SCNC: 100 MMOL/L (ref 98–107)
CO2 SERPL-SCNC: 26 MMOL/L (ref 21–32)
CREAT SERPL-MCNC: 0.75 MG/DL (ref 0.5–1.05)
EGFRCR SERPLBLD CKD-EPI 2021: 80 ML/MIN/1.73M*2
ERYTHROCYTE [DISTWIDTH] IN BLOOD BY AUTOMATED COUNT: 13.2 % (ref 11.5–14.5)
GLUCOSE SERPL-MCNC: 93 MG/DL (ref 74–99)
HCT VFR BLD AUTO: 27.9 % (ref 36–46)
HGB BLD-MCNC: 9.2 G/DL (ref 12–16)
MAGNESIUM SERPL-MCNC: 1.87 MG/DL (ref 1.6–2.4)
MCH RBC QN AUTO: 29.5 PG (ref 26–34)
MCHC RBC AUTO-ENTMCNC: 33 G/DL (ref 32–36)
MCV RBC AUTO: 89 FL (ref 80–100)
NRBC BLD-RTO: 0 /100 WBCS (ref 0–0)
PLATELET # BLD AUTO: 155 X10*3/UL (ref 150–450)
POTASSIUM SERPL-SCNC: 3.9 MMOL/L (ref 3.5–5.3)
RBC # BLD AUTO: 3.12 X10*6/UL (ref 4–5.2)
SODIUM SERPL-SCNC: 132 MMOL/L (ref 136–145)
WBC # BLD AUTO: 8.6 X10*3/UL (ref 4.4–11.3)

## 2024-03-01 PROCEDURE — 99239 HOSP IP/OBS DSCHRG MGMT >30: CPT | Performed by: STUDENT IN AN ORGANIZED HEALTH CARE EDUCATION/TRAINING PROGRAM

## 2024-03-01 PROCEDURE — 85027 COMPLETE CBC AUTOMATED: CPT | Performed by: INTERNAL MEDICINE

## 2024-03-01 PROCEDURE — 97530 THERAPEUTIC ACTIVITIES: CPT | Mod: GO,CO

## 2024-03-01 PROCEDURE — 97116 GAIT TRAINING THERAPY: CPT | Mod: GP,CQ

## 2024-03-01 PROCEDURE — 83735 ASSAY OF MAGNESIUM: CPT | Performed by: INTERNAL MEDICINE

## 2024-03-01 PROCEDURE — 2500000001 HC RX 250 WO HCPCS SELF ADMINISTERED DRUGS (ALT 637 FOR MEDICARE OP): Performed by: SPECIALIST

## 2024-03-01 PROCEDURE — 99231 SBSQ HOSP IP/OBS SF/LOW 25: CPT | Performed by: PHYSICIAN ASSISTANT

## 2024-03-01 PROCEDURE — 80048 BASIC METABOLIC PNL TOTAL CA: CPT | Performed by: INTERNAL MEDICINE

## 2024-03-01 PROCEDURE — 36415 COLL VENOUS BLD VENIPUNCTURE: CPT | Performed by: INTERNAL MEDICINE

## 2024-03-01 PROCEDURE — 2500000001 HC RX 250 WO HCPCS SELF ADMINISTERED DRUGS (ALT 637 FOR MEDICARE OP): Performed by: INTERNAL MEDICINE

## 2024-03-01 PROCEDURE — 97110 THERAPEUTIC EXERCISES: CPT | Mod: GP,CQ

## 2024-03-01 PROCEDURE — RXMED WILLOW AMBULATORY MEDICATION CHARGE

## 2024-03-01 RX ORDER — ASPIRIN 81 MG/1
81 TABLET ORAL DAILY
Qty: 30 TABLET | Refills: 1 | Status: SHIPPED | OUTPATIENT
Start: 2024-03-02 | End: 2024-05-01

## 2024-03-01 RX ORDER — ENOXAPARIN SODIUM 100 MG/ML
40 INJECTION SUBCUTANEOUS DAILY
Qty: 28 EACH | Refills: 0 | Status: SHIPPED | OUTPATIENT
Start: 2024-03-02 | End: 2024-03-30

## 2024-03-01 RX ORDER — TRAMADOL HYDROCHLORIDE 50 MG/1
50 TABLET ORAL EVERY 8 HOURS PRN
Qty: 2 TABLET | Refills: 0 | Status: SHIPPED | OUTPATIENT
Start: 2024-03-01 | End: 2024-03-02

## 2024-03-01 RX ORDER — SENNOSIDES 8.6 MG/1
2 TABLET ORAL 2 TIMES DAILY
Qty: 120 TABLET | Refills: 0 | Status: SHIPPED | OUTPATIENT
Start: 2024-03-01 | End: 2024-03-31

## 2024-03-01 RX ADMIN — ACETAMINOPHEN 650 MG: 325 TABLET ORAL at 00:18

## 2024-03-01 RX ADMIN — ACETAMINOPHEN 650 MG: 325 TABLET ORAL at 06:01

## 2024-03-01 RX ADMIN — LISINOPRIL 20 MG: 20 TABLET ORAL at 08:18

## 2024-03-01 RX ADMIN — SENNOSIDES 17.2 MG: 8.6 TABLET, FILM COATED ORAL at 08:17

## 2024-03-01 RX ADMIN — HYDROXYCHLOROQUINE SULFATE 200 MG: 200 TABLET, FILM COATED ORAL at 08:18

## 2024-03-01 RX ADMIN — AMLODIPINE BESYLATE 2.5 MG: 2.5 TABLET ORAL at 08:18

## 2024-03-01 RX ADMIN — LEVOTHYROXINE SODIUM 112 MCG: 112 TABLET ORAL at 06:01

## 2024-03-01 RX ADMIN — Medication 2000 UNITS: at 08:18

## 2024-03-01 RX ADMIN — ASPIRIN 81 MG: 81 TABLET, COATED ORAL at 08:18

## 2024-03-01 RX ADMIN — ISOSORBIDE MONONITRATE 15 MG: 30 TABLET, EXTENDED RELEASE ORAL at 08:17

## 2024-03-01 RX ADMIN — ACETAMINOPHEN 650 MG: 325 TABLET ORAL at 12:15

## 2024-03-01 ASSESSMENT — COGNITIVE AND FUNCTIONAL STATUS - GENERAL
DAILY ACTIVITIY SCORE: 12
STANDING UP FROM CHAIR USING ARMS: A LOT
DRESSING REGULAR UPPER BODY CLOTHING: A LOT
MOBILITY SCORE: 11
DAILY ACTIVITIY SCORE: 11
MOVING TO AND FROM BED TO CHAIR: A LOT
MOVING TO AND FROM BED TO CHAIR: A LOT
PERSONAL GROOMING: A LITTLE
TOILETING: TOTAL
CLIMB 3 TO 5 STEPS WITH RAILING: TOTAL
DRESSING REGULAR LOWER BODY CLOTHING: TOTAL
STANDING UP FROM CHAIR USING ARMS: A LOT
EATING MEALS: A LITTLE
WALKING IN HOSPITAL ROOM: A LOT
TURNING FROM BACK TO SIDE WHILE IN FLAT BAD: A LOT
DRESSING REGULAR LOWER BODY CLOTHING: TOTAL
MOVING FROM LYING ON BACK TO SITTING ON SIDE OF FLAT BED WITH BEDRAILS: A LOT
PERSONAL GROOMING: A LITTLE
WALKING IN HOSPITAL ROOM: TOTAL
HELP NEEDED FOR BATHING: TOTAL
CLIMB 3 TO 5 STEPS WITH RAILING: TOTAL
TURNING FROM BACK TO SIDE WHILE IN FLAT BAD: A LOT
MOBILITY SCORE: 10
HELP NEEDED FOR BATHING: A LOT
DRESSING REGULAR UPPER BODY CLOTHING: A LOT
EATING MEALS: A LITTLE
MOVING FROM LYING ON BACK TO SITTING ON SIDE OF FLAT BED WITH BEDRAILS: A LOT
TOILETING: TOTAL

## 2024-03-01 ASSESSMENT — PAIN - FUNCTIONAL ASSESSMENT
PAIN_FUNCTIONAL_ASSESSMENT: 0-10

## 2024-03-01 ASSESSMENT — PAIN SCALES - GENERAL
PAINLEVEL_OUTOF10: 6
PAINLEVEL_OUTOF10: 0 - NO PAIN
PAINLEVEL_OUTOF10: 0 - NO PAIN
PAINLEVEL_OUTOF10: 4
PAINLEVEL_OUTOF10: 0 - NO PAIN

## 2024-03-01 NOTE — PROGRESS NOTES
Physical Therapy    Physical Therapy    Physical Therapy Treatment    Patient Name: Meka Shelby  MRN: [id number]  Today's Date: 3/1/2024  Time Calculation  Start Time: 1126  Stop Time: 1150  Time Calculation (min): 24 min       Assessment/Plan   PT Assessment  PT Assessment Results: Decreased strength, Decreased range of motion, Decreased endurance, Impaired balance, Decreased mobility, Decreased safety awareness, Pain, Impaired judgement, Decreased cognition, Orthopedic restrictions  Rehab Prognosis: Fair  End of Session Communication: Bedside nurse  End of Session Patient Position: Bed, 3 rail up, Alarm on  PT Plan  Inpatient/Swing Bed or Outpatient: Inpatient  PT Plan  Treatment/Interventions: Bed mobility, Transfer training, Gait training, Balance training, Neuromuscular re-education, Strengthening, Endurance training, Range of motion, Therapeutic exercise, Therapeutic activity, Home exercise program  PT Plan: Skilled PT  PT Frequency: Daily  PT Discharge Recommendations: Moderate intensity level of continued care  Equipment Recommended upon Discharge: Wheeled walker  PT Recommended Transfer Status: Assist x2 (FWW, gait belt)  PT - OK to Discharge: Yes (     03/01/24 1126   PT  Visit   PT Received On 03/01/24   General   Reason for Referral s/p (L) ORIF with nailing   Referred By Dr. Combs   General Comment Patient agreeable to therapy.   Pain Assessment   Pain Assessment 0-10   Pain Score 6  (with movement)   Pain Type Surgical pain   Pain Location Hip   Pain Orientation Left   General Observation   General Observation Patient slow to respond is [place]   Therapeutic Exercise   Therapeutic Exercise Performed Yes   Therapeutic Exercise Activity 1 B x 15  (ap,qs,gs,laq)   Bed Mobility   Bed Mobility Yes   Bed Mobility 1   Bed Mobility 1 Supine to sitting;Sitting to supine   Level of Assistance 1 Maximum assistance  (x2)   Transfers   Transfer Yes   Transfer 1   Transfer Device 1 Walker   Transfer Level of Assistance 1  Maximum assistance  (x2)   Trials/Comments 1 Patient stood in walker x 30-45 seconda   PT Assessment   PT Assessment Results Decreased strength;Decreased range of motion;Decreased endurance;Impaired balance;Decreased mobility;Decreased safety awareness;Pain;Impaired judgement;Decreased cognition;Orthopedic restrictions   Rehab Prognosis Fair   End of Session Communication Bedside nurse   End of Session Patient Position Bed, 3 rail up;Alarm on   PT Plan   Inpatient/Swing Bed or Outpatient Inpatient     Outcome Measures:  [facility] Basic Mobility  Turning from your back to your side while in a flat bed without using bedrails: A lot  Moving from lying on your back to sitting on the side of a flat bed without using bedrails: A lot  Moving to and from bed to chair (including a wheelchair): A lot  Standing up from a chair using your arms (e.g. wheelchair or bedside chair): A lot  To walk in hospital room: Total  Climbing 3-5 steps with railing: Total  Basic Mobility - Total Score: 10                             EDUCATION:     Education Documentation  No documentation found.  Education Comments  No comments found.        GOALS:  Encounter Problems       Encounter Problems (Active)       PT Problem       Pt will perform bed mobility with min A.  (Progressing)       Start:  02/29/24    Expected End:  03/14/24            Pt will complete sit <> stand and bed <> chair transfers with min A.  (Progressing)       Start:  02/29/24    Expected End:  03/14/24            Pt will ambulate 20 feet min A using FWW with no significant gait deviations.   (Progressing)       Start:  02/29/24    Expected End:  03/14/24            Pt will progress to completing 3 x 20 supine/seated exercises in order to increase strength and improve gait mechanics.   (Progressing)       Start:  02/29/24    Expected End:  03/14/24

## 2024-03-01 NOTE — PROGRESS NOTES
Meka Shelby is a 83 y.o. female on day 2 of admission presenting with Closed displaced intertrochanteric fracture of left femur, initial encounter (CMS/[facility]).    Subjective   Patient was seen and examined in her room, working with physical therapy she had an episode of dizziness with getting up, she was put back in her bed.  She states at the time that she was seen, she was actually enjoying lunch, that she was feeling much better.  She tolerated the hip fracture surgery well, she did not endorse having a large amount of pain at the time that she got up with physical therapy but did note the dizziness, otherwise she was tolerating her diet denied any fevers or chills, is passing gas, and able to move all of her extremities.    Objective     Physical Exam  Vitals reviewed.   Constitutional:       General: She is not in acute distress.     Appearance: Normal appearance. She is not ill-appearing.   HENT:      Head: Normocephalic and atraumatic.      Nose: Nose normal.      Mouth/Throat:      Mouth: Mucous membranes are moist.   Eyes:      General: No visual field deficit or scleral icterus.     Extraocular Movements: Extraocular movements intact.      Pupils: Pupils are equal, round, and reactive to light.   Cardiovascular:      Rate and Rhythm: Normal rate and regular rhythm.      Pulses: Normal pulses.      Heart sounds: Normal heart sounds. No murmur heard.     No friction rub. No gallop.   Pulmonary:      Effort: Pulmonary effort is normal. No respiratory distress.      Breath sounds: Normal breath sounds. No wheezing, rhonchi or rales.   Abdominal:      General: Abdomen is flat. Bowel sounds are normal. There is no distension.      Palpations: Abdomen is soft. There is no mass.      Tenderness: There is no abdominal tenderness. There is no guarding or rebound.   Musculoskeletal:         General: No swelling, tenderness or signs of injury.      Cervical back: Neck supple.      Right lower leg: No edema.      Left  "lower leg: No edema.   Skin:     General: Skin is warm and dry.      Coloration: Skin is not jaundiced or pale.      Findings: No bruising, erythema, lesion or rash.   Neurological:      General: No focal deficit present.      Mental Status: She is alert and oriented to person, place, and time. Mental status is at baseline.      GCS: GCS eye subscore is 4. GCS verbal subscore is 5. GCS motor subscore is 6.      Cranial Nerves: Facial asymmetry (Mild left corner of the mouth droop without nasolabial fold change) present. No cranial nerve deficit or dysarthria.      Sensory: No sensory deficit.      Motor: No weakness, tremor or pronator drift.   Psychiatric:         Mood and Affect: Mood normal.         Behavior: Behavior normal.         Thought Content: Thought content normal.         Judgment: Judgment normal.         Last Recorded Vitals  Blood pressure 131/60, pulse 61, temperature 36.3 °C (97.3 °F), resp. rate 16, height 1.575 m (5' 2\"), weight 59 kg (130 lb), SpO2 94 %.  Intake/Output last 3 Shifts:  I/O last 3 completed shifts:  In: 1820 (30.9 mL/kg) [P.O.:720; I.V.:1100 (18.7 mL/kg)]  Out: 1305 (22.1 mL/kg) [Urine:1280 (0.6 mL/kg/hr); Blood:25]  Weight: 59 kg     Relevant Results  Scheduled medications  acetaminophen, 650 mg, oral, q6h OLIVIA  amLODIPine, 2.5 mg, oral, Daily  aspirin, 81 mg, oral, Daily  cholecalciferol, 2,000 Units, oral, Daily  enoxaparin, 40 mg, subcutaneous, Daily  famotidine, 20 mg, oral, Nightly  hydroxychloroquine, 200 mg, oral, BID  isosorbide mononitrate ER, 15 mg, oral, Daily  latanoprost, 1 drop, Both Eyes, Nightly  levothyroxine, 112 mcg, oral, Daily before breakfast  lisinopril, 20 mg, oral, Daily  sennosides, 2 tablet, oral, BID  simvastatin, 10 mg, oral, Nightly      Continuous medications  oxygen, 2 L/min, Last Rate: Stopped (02/28/24 1630)      PRN medications  PRN medications: benzocaine-menthol, bisacodyl, diphenhydrAMINE, hydrALAZINE, naloxone, naloxone, ondansetron ODT " **OR** ondansetron, oxyCODONE, oxyCODONE  Results for orders placed or performed during the hospital encounter of 02/27/24 (from the past 24 hour(s))   CBC   Result Value Ref Range    WBC 9.1 4.4 - 11.3 x10*3/uL    nRBC 0.0 0.0 - 0.0 /100 WBCs    RBC 3.43 (L) 4.00 - 5.20 x10*6/uL    Hemoglobin 10.2 (L) 12.0 - 16.0 g/dL    Hematocrit 30.9 (L) 36.0 - 46.0 %    MCV 90 80 - 100 fL    MCH 29.7 26.0 - 34.0 pg    MCHC 33.0 32.0 - 36.0 g/dL    RDW 13.2 11.5 - 14.5 %    Platelets 165 150 - 450 x10*3/uL   Magnesium   Result Value Ref Range    Magnesium 1.88 1.60 - 2.40 mg/dL   Basic Metabolic Panel   Result Value Ref Range    Glucose 100 (H) 74 - 99 mg/dL    Sodium 131 (L) 136 - 145 mmol/L    Potassium 4.1 3.5 - 5.3 mmol/L    Chloride 100 98 - 107 mmol/L    Bicarbonate 24 21 - 32 mmol/L    Anion Gap 11 10 - 20 mmol/L    Urea Nitrogen 13 6 - 23 mg/dL    Creatinine 0.62 0.50 - 1.05 mg/dL    eGFR 89 >60 mL/min/1.73m*2    Calcium 8.2 (L) 8.6 - 10.3 mg/dL   Basic Metabolic Panel   Result Value Ref Range    Glucose 149 (H) 74 - 99 mg/dL    Sodium 130 (L) 136 - 145 mmol/L    Potassium 4.0 3.5 - 5.3 mmol/L    Chloride 97 (L) 98 - 107 mmol/L    Bicarbonate 25 21 - 32 mmol/L    Anion Gap 12 10 - 20 mmol/L    Urea Nitrogen 22 6 - 23 mg/dL    Creatinine 0.95 0.50 - 1.05 mg/dL    eGFR 60 (L) >60 mL/min/1.73m*2    Calcium 8.3 (L) 8.6 - 10.3 mg/dL     Transthoracic Echo Complete    Result Date: 2/28/2024            [facility] [address], [facility] [address]    Tel [phone] Fax [phone] TRANSTHORACIC ECHOCARDIOGRAM REPORT  Patient Name:      JESSICA Holley Physician:    [id number] Trevon Fuller MD Study Date:        2/28/2024             Ordering Provider:    [id number] MADHU MADDOX MRN/PID:           [id number]              Fellow: Accession#:        [id number]          Nurse:  Date of Birth/Age: 1940 / 83 years  Sonographer:          Keyana Ortiz                                                                RDCS Gender:            F                     Additional Staff: Height:            157.48 cm             Admit Date: Weight:            58.97 kg              Admission Status:     Inpatient -                                                                Routine BSA / BMI:         1.59 m2 / 23.78 kg/m2 Department Location:  [facility] Echo Lab Blood Pressure: 154 /79 mmHg Study Type:    TRANSTHORACIC ECHO (TTE) COMPLETE Diagnosis/ICD: Elevated Troponin-R79.89 Indication:    elevated troponin  Study Detail: The following Echo studies were performed: 2D, M-Mode, Doppler and               color flow. Technically challenging study due to patient lying in               supine position.  PHYSICIAN INTERPRETATION: Left Ventricle: Left ventricular systolic function is mildly decreased. Wall motion is abnormal. The left ventricular cavity size is upper limits of normal. There is mild concentric left ventricular hypertrophy. Spectral Doppler shows an impaired relaxation pattern of left ventricular diastolic filling. Left Atrium: The left atrium is mildly dilated. Right Ventricle: The right ventricle is normal in size. There is normal right ventricular global systolic function. Right Atrium: The right atrium is normal in size. Aortic Valve: The aortic valve appears structurally normal. There is mild aortic valve cusp calcification. There is trivial aortic valve regurgitation. The peak instantaneous gradient of the aortic valve is 5.9 mmHg. Mitral Valve: The mitral valve is normal in structure. There is mild mitral annular calcification. There is mild mitral valve regurgitation. Tricuspid Valve: The tricuspid valve is structurally normal. There is mild tricuspid regurgitation. Pulmonic Valve: The pulmonic valve was not assessed. The pulmonic valve regurgitation was not assessed. Pericardium:  There is no pericardial effusion noted. Aorta: The aortic root is normal.  CONCLUSIONS:  1. Left ventricular systolic function is mildly decreased.  2. Spectral Doppler shows an impaired relaxation pattern of left ventricular diastolic filling.  3. Mild LVH mildly dilated LV with EF of 45-50% and hypokinesis more prominent in the apex.  4. Mildly dilated atria with mild mitral regurgitation. QUANTITATIVE DATA SUMMARY: 2D MEASUREMENTS:                           Normal Ranges: LAs:           3.75 cm    (2.7-4.0cm) IVSd:          1.38 cm    (0.6-1.1cm) LVPWd:         1.28 cm    (0.6-1.1cm) LVIDd:         4.35 cm    (3.9-5.9cm) LVIDs:         3.25 cm LV Mass Index: 137.3 g/m2 LV % FS        25.3 % LA VOLUME:                              Normal Ranges: LA Vol A4C:       33.7 ml    (22+/-6mL/m2) LA Vol A2C:       46.1 ml LA Vol BP:        40.6 ml LA Vol Index A4C: 21.2 ml/m2 LA Vol Index A2C: 28.9 ml/m2 LA Vol Index BP:  25.5 ml/m2 LA Volume Index:  26.0 ml/m2 LA Vol A4C:       32.7 ml LA Vol A2C:       44.4 ml M-MODE MEASUREMENTS:                  Normal Ranges: AoV Exc: 1.78 cm (1.5-2.5cm) LAs:     3.81 cm (2.7-4.0cm) AORTA MEASUREMENTS:                  Normal Ranges: AoV Exc: 1.78 cm (1.5-2.5cm) LV SYSTOLIC FUNCTION BY 2D PLANIMETRY (MOD):                     Normal Ranges: EF-A4C View: 48.5 % (>=55%) EF-A2C View: 46.7 % EF-Biplane:  48.3 % LV DIASTOLIC FUNCTION:                      Normal Ranges: MV Peak E:  0.70 m/s (0.7-1.2 m/s) MV Peak A:  1.10 m/s (0.42-0.7 m/s) E/A Ratio:  0.64     (1.0-2.2) MV e'       0.14 m/s (>8.0) E/e' Ratio: 5.20     (<8.0) MITRAL VALVE:                 Normal Ranges: MV DT: 184 msec (150-240msec) AORTIC VALVE:                         Normal Ranges: AoV Vmax:      1.22 m/s (<=1.7m/s) AoV Peak P.9 mmHg (<20mmHg) LVOT Max Aleks:  0.81 m/s (<=1.1m/s) LVOT Diameter: 2.29 cm  (1.8-2.4cm) AoV Area,Vmax: 2.76 cm2 (2.5-4.5cm2)  RIGHT VENTRICLE: RV Basal 3.20 cm RV Mid   2.70 cm RV Major 6.8  cm TAPSE:   17.0 mm AORTA: Asc Ao Diam 3.36 cm  [id number] Trevon Fuller MD Electronically signed on 2/28/2024 at 4:17:02 PM  ** Final **     FL fluoro images no charge    Result Date: 2/28/2024  These images are not reportable by radiology and will not be interpreted by  Radiologists.    ECG 12 Lead    Result Date: 2/28/2024  Sinus rhythm with 1st degree AV block Right bundle branch block Anterolateral infarct , age undetermined Abnormal ECG When compared with ECG of 27-FEB-2024 15:07, (unconfirmed) QRS axis Shifted left Criteria for Inferior infarct are no longer Present Confirmed by Trevon Fuller ([id number]) on 2/28/2024 10:48:21 AM    CT hip left wo IV contrast    Result Date: 2/28/2024  Interpreted By:  Celestina Moreira, STUDY: CT HIP LEFT WO IV CONTRAST;  2/28/2024 9:18 am   INDICATION: Signs/Symptoms:Left IT fracture.   COMPARISON: Pelvis left hip radiographs dated 02/27/2024   ACCESSION NUMBER(S): [id number]   ORDERING CLINICIAN: MADHU MADDOX   TECHNIQUE: CT imaging of the  left lower extremity was obtained  without administration of intravenous contrast medium. Coronal and sagittal reformatted images were performed. 3D reformatted images were created and reviewed.   FINDINGS: OSSEOUS STRUCTURES: There is acute comminuted fracture through intertrochanteric region of left femur. There is significant displacement with impaction of fracture fragments. There is proximal and lateral displacement of the distal fracture fragment with a proximally 1.5 cm overriding of fracture fragments. There is also external rotation of the distal fracture fragment there is anterior and lateral angulation of the fracture apex. There is also another dominant transverse fracture line extending through base of greater trochanter with mild superior displacement of fracture fragments. No definite evidence of extension of fracture lucency to the femoral head articular surface. The left femoroacetabular joint articulation is maintained  without evidence of subluxation or dislocation. The rest of the visualized proximal left femoral diaphysis is intact. Visualized left iliac bone is intact. No additional fractures are noted through the acetabulum. There are mild degenerative changes in the left hip joint with joint space narrowing and subchondral cystic changes in the acetabulum. Scratch 3rd   SOFT TISSUES: There is moderate amount of intramedullary hemorrhage about the fracture site. There is significant muscular edema in the adductor compartment musculature and also extending along the medial aspect of the proximal thigh. Otherwise no large fluid collection or hematoma is noted within limits of noncontrast technique. There is ill-defined reticular subcutaneous soft tissue edema along the lateral aspect of the proximal left thigh, likely favored to be related to soft tissue contusion.       1. Acute comminuted and significantly impacted fracture through intertrochanteric region of proximal left femur with significant displacement of fracture fragments as described above. 2. Mild left hip arthrosis. 3. Posttraumatic soft tissue changes in the medial compartment of the thigh as described above.       MACRO: None   Signed by: Celestina Moreira 2/28/2024 10:12 AM Dictation workstation:   [id number]    ECG 12 lead    Result Date: 2/28/2024  Sinus rhythm with 1st degree AV block Possible Left atrial enlargement Left axis deviation Right bundle branch block Inferior infarct , age undetermined Abnormal ECG When compared with ECG of 27-FEB-2024 13:36, (unconfirmed) Criteria for Anteroseptal infarct are no longer Present No significant change was found    CT cervical spine wo IV contrast    Result Date: 2/27/2024  Interpreted By:  Angélica Jansen, STUDY: CT CERVICAL SPINE WO IV CONTRAST;  2/27/2024 2:09 pm   INDICATION: Signs/Symptoms:fall.   COMPARISON: None.   ACCESSION NUMBER(S): [id number]   ORDERING CLINICIAN: RAZ ZURITA   TECHNIQUE: Thin section  axial images were obtained from the skull base down through the thoracic inlet. Sagittal and coronal reconstruction images were generated. Soft tissue, lung, and bone windows were reviewed.   FINDINGS: VERTEBRAL BODIES AND POSTERIOR ELEMENTS: No cervical spine compression fracture.  No posterior element fracture.  No destructive bone lesion. No traumatic listhesis.   SPINAL CANAL/NEURAL FORAMINA: Multilevel spondylosis, most pronounced at C6-C7 with moderate to severe spinal canal stenosis and severe bilateral neural foraminal narrowing due to prominent posterior disc osteophyte complex.   NECK SOFT TISSUES: No acute abnormalities.   LUNG APICES: Clear.   SKULL BASE: No acute abnormalities.       No acute fracture or traumatic malalignment.   Multilevel spondylosis, most pronounced at C6-C7 with moderate to severe spinal canal stenosis and severe bilateral neural foraminal narrowing.   Signed by: Angélica Jansen 2/27/2024 2:40 PM Dictation workstation:   [id number]    ECG 12 lead    Result Date: 2/27/2024  Sinus rhythm with 1st degree AV block Possible Left atrial enlargement Left axis deviation Right bundle branch block Anteroseptal infarct , age undetermined Abnormal ECG No previous ECGs available    CT head wo IV contrast    Result Date: 2/27/2024  Interpreted By:  Angélica Jansen, STUDY: CT HEAD WO IV CONTRAST;  2/27/2024 2:09 pm   INDICATION: Signs/Symptoms:fall.   COMPARISON: None.   ACCESSION NUMBER(S): [id number]   ORDERING CLINICIAN: RAZ ZURITA   TECHNIQUE: Noncontrast axial CT scan of head was performed.   FINDINGS: Parenchyma: There is no intracranial hemorrhage. The grey-white differentiation is intact. There is no mass effect or midline shift. Patchy periventricular white matter hypodensities, likely moderate chronic microvascular ischemic change.   CSF Spaces: The ventricles, sulci and basal cisterns are within normal limits for age.   Extra-Axial Fluid: There is no extraaxial fluid collection.    Calvarium: No acute fracture.   Paranasal sinuses: Visualized paranasal sinuses are clear.   Mastoids: Clear.   Orbits: Normal.   Soft tissues: Unremarkable.       No acute intracranial abnormality or calvarial fracture.   MACRO None   Signed by: AmilcarAmaliaYinka Penaan 2/27/2024 2:36 PM Dictation workstation:   [id number]    XR hip left with pelvis when performed 2 or 3 views    Result Date: 2/27/2024  Interpreted By:  Qamar Marcum, STUDY: XR HIP LEFT WITH PELVIS WHEN PERFORMED 2 OR 3 VIEWS;  2/27/2024 12:52 pm   INDICATION: Signs/Symptoms:fall.   COMPARISON: None.   ACCESSION NUMBER(S): [id number]   ORDERING CLINICIAN: RAZ ZURITA   TECHNIQUE: AP view pelvis and 2 views  of the  left hip were obtained.   FINDINGS: Osteopenic bones. Multilevel mid to distal lumbar spine disc space narrowing with endplate osteophytosis. Sclerotic arthritic changes in both SI joints. Mild bilateral hip joint space narrowing. Prominent aortoiliac and femoral artery calcifications. Flocculent rounded central pelvic calcification measuring 19 mm in diameter, most likely a calcified uterine fibroid. No lytic or blastic destructive bone lesion. There is an acute comminuted displaced intertrochanteric fracture of the proximal left femur. There is mild superior displacement of the femoral fragment relative to the neck fragment, with mild impaction of fragments. Also medial displacement of the lesser trochanteric fragments and posterior displacement of the greater trochanteric fragments. No femoral head involvement. No dislocation. No opaque soft tissue foreign body. No periosteal reaction or erosion.       Acute comminuted impacted and angulated fracture through the intertrochanteric proximal left femur as described.   MACRO: None   Signed by: Qamar Marcum 2/27/2024 1:14 PM Dictation workstation:   [id number]    XR chest 1 view    Result Date: 2/27/2024  Interpreted By:  Qamar Marcum, STUDY: XR CHEST 1 VIEW;  2/27/2024 12:52 pm    INDICATION: Signs/Symptoms:Chest Pain.   COMPARISON: None.   ACCESSION NUMBER(S): [id number]   ORDERING CLINICIAN: RAZ ZURITA   TECHNIQUE: Single AP portable view of the chest was obtained.   FINDINGS: MEDIASTINUM/ LUNGS/ [place]: Suboptimal level of inspiration. Elevation of the right diaphragm. Cardiomegaly without vascular congestion or pleural effusion. The No abnormal opacity in either lung worrisome for tumor or pneumonia. No pneumothorax. No tracheal deviation. No abnormal hilar fullness or gross mass on either side.   BONES: No lytic or blastic destructive bone lesion.   UPPER ABDOMEN: Grossly intact.       Hypoventilatory exam. Elevation of the right diaphragm.   Mild cardiomegaly.  Currently without radiographic evidence of CHF or pneumonia.   MACRO: None   Signed by: Qamar Marcum 2/27/2024 1:13 PM Dictation workstation:   [id number]       Assessment/Plan   Principal Problem:    Closed displaced intertrochanteric fracture of left femur, initial encounter (CMS/[facility])  Active Problems:    Hyponatremia    Hypertensive emergency    Hypertension    Osteopenia    SLE (systemic lupus erythematosus related syndrome) (CMS/[facility])    Troponin level elevated    Hyperlipidemia    DDD (degenerative disc disease), cervical    Lichen planus    Hypothyroidism    Scoliosis    Plan:  - Patient stable to remain at current level of care on telemetry  - Continue home medications as previously ordered  - Follow-up with cardiology, continue on Imdur, lisinopril  - Status post furosemide 20 mg  - Keep track of I's and O's, Arevalo catheter in place since the time of surgery  - Low-sodium diet  - Full code  - VTE prophylaxis per orthopedics  - Continue current analgesia regimen         I spent 45 minutes in the professional and overall care of this patient.      Heron Santana MD

## 2024-03-01 NOTE — PROGRESS NOTES
Occupational Therapy    OT Treatment    Patient Name: Meka Shelby  MRN: [id number]  Today's Date: 3/1/2024  Time Calculation  Start Time: 1117  Stop Time: 1141  Time Calculation (min): 24 min         Assessment:    Patient requiring 2 person assist with all mobility at this time.  Anticipate need for moderate intensity therapy post hospital stay.       Plan:OT Frequency: Daily  OT Discharge Recommendations: Moderate intensity level of continued care  Equipment Recommended upon Discharge: Wheeled walker        Subjective     Current Problem:  Patient Active Problem List   Diagnosis    Closed displaced intertrochanteric fracture of left femur (CMS/[facility])    Closed displaced intertrochanteric fracture of left femur, initial encounter (CMS/[facility])    Hypertension    Hyperlipidemia    Osteopenia    SLE (systemic lupus erythematosus related syndrome) (CMS/[facility])    DDD (degenerative disc disease), cervical    Lichen planus    Hypothyroidism    Scoliosis    Hyponatremia    Troponin level elevated    Hypertensive emergency       General:  OT Received On: 03/01/24  Reason for Referral: s/p (L) ORIF with nailing  Co-Treatment: PT  Co-Treatment Reason: 2 person assist  General Comment: Patient in bed and somewhat confused.  Okay with nurse to work with patient.    Vital Signs:       Pain:  Pain Assessment  Pain Assessment:  (Patient c/o pain when moved/with movement, but no level reported.)  Pain Location:  (LLE)  Objective      Activities of Daily Living:                        Functional Standing Tolerance:       Bed Mobility/Transfers: Bed Mobility  Bed Mobility: Yes  Bed Mobility 1  Bed Mobility 1: Supine to sitting  Level of Assistance 1: Maximum assistance  Bed Mobility Comments 1: Assist of two for bed mobility. Patient resisting a bit with supine to sit.  Once seated edge of bed patient was able to maintain her balance with moderate assist initially, then CGA after sitting a minute.  Bed Mobility 2  Bed Mobility  2: Sitting to  supine  Level of Assistance 2: Maximum assistance (Assist of two)  Transfers  Transfer: Yes  Transfer 1  Transfer From 1: Sit to  Transfer to 1: Stand  Transfer Level of Assistance 1: Moderate assistance, +2  Trials/Comments 1: Once standing, patient was not able to take side steps up toward head of bed.  Patient's hips were guided down to the mattress and patient was returned to supine, head of bed then elevated.                Therapy/Activity:  Too unsafe at this time to attempt transfer from bed to recliner.  Patient not advancing her feet despite maximum assist and encouragement.              Strength:       Other Activity:       Outcome Measures:[facility] Daily Activity  Putting on and taking off regular lower body clothing: Total  Bathing (including washing, rinsing, drying): A lot  Putting on and taking off regular upper body clothing: A lot  Toileting, which includes using toilet, bedpan or urinal: Total  Taking care of personal grooming such as brushing teeth: A little  Eating Meals: A little  Daily Activity - Total Score: 12  Education Documentation  No documentation found.  Education Comments  No comments found.        EDUCATION:  Education  Individual(s) Educated: Patient  Education Provided: Fall precautons, Risk and benefits of OT discussed with patient or other  Plan of Care Discussed and Agreed Upon: yes  Patient Response to Education: Patient/Caregiver Verbalized Understanding of Information    Goals:  Encounter Problems       Encounter Problems (Active)       OT Goals       Patient will complete functional transfers with min A. (Progressing)       Start:  02/29/24    Expected End:  03/14/24            Patient will complete toileting with min A. (Progressing)       Start:  02/29/24    Expected End:  03/14/24            Patient will complete LE dressing with min A. (Progressing)       Start:  02/29/24    Expected End:  03/14/24

## 2024-03-01 NOTE — DISCHARGE SUMMARY
Discharge Diagnosis  Closed displaced intertrochanteric fracture of left femur, initial encounter (CMS/[facility])    Issues Requiring Follow-Up  Orthopedic team    Discharge Meds     Your medication list        START taking these medications        Instructions Last Dose Given Next Dose Due   aspirin 81 mg EC tablet  Start taking on: March 2, 2024      Take 1 tablet (81 mg) by mouth once daily. Do not start before March 2, 2024.       enoxaparin 40 mg/0.4 mL syringe  Commonly known as: Lovenox  Start taking on: March 2, 2024      Inject 0.4 mL (40 mg) under the skin once daily for 28 doses. Do not start before March 2, 2024.       sennosides 8.6 mg tablet  Commonly known as: Senokot      Take 2 tablets (17.2 mg) by mouth 2 times a day. Bowel Regimen - for prevention of constipation  Hold for loose stools              CONTINUE taking these medications        Instructions Last Dose Given Next Dose Due   amLODIPine 2.5 mg tablet  Commonly known as: Norvasc           cholecalciferol 50 MCG (2000 UT) tablet  Commonly known as: Vitamin D-3           famotidine 20 mg tablet  Commonly known as: Pepcid           hydroxychloroquine 200 mg tablet  Commonly known as: Plaquenil           lisinopril 20 mg tablet           nabumetone 500 mg tablet  Commonly known as: Relafen           simvastatin 20 mg tablet  Commonly known as: Zocor           Synthroid 112 mcg tablet  Generic drug: levothyroxine           Travatan Z 0.004 % drops ophthalmic solution  Generic drug: travoprost                     Where to Get Your Medications        These medications were sent to [facility]  [address] [place] [address], [address], [place] [address]      Hours: 8:30 AM to 5 PM Mon-Fri, 9 AM to 1 PM Sat Phone: [phone]   aspirin 81 mg EC tablet  enoxaparin 40 mg/0.4 mL syringe  sennosides 8.6 mg tablet         Test Results Pending At Discharge  Pending Labs       No current pending labs.            Hospital Course   Meka Shelby is a 83 y.o. female  presenting with fall.     Is a very pleasant 83-year-old lady who is joined by her daughter who assists in providing some of the history, although the patient is noted to be an adequate historian.  The patient came to this facility after sustaining a fall in her home, this happened approximately 1 to 2 hours prior to her arrival, she states that she lost her balance and landed on her rear end, she did not feel like she was able to walk since that time due to pain limiting her ability, namely in the left hip area.  She did not hit her head, she did not lose consciousness, she had no prodrome with lightheadedness, there has been no chest pain, shortness of breath, weakness or tingling, she has had no recent illness, she was not incontinent of urine or feces during the episode.  She did have some transient dizziness after the fall that she thinks was related to the intense pain, although at the time of interview and examination (after having been medicated, see below), she did not have any current complaints other than baseline difficulty with hearing.  She does live alone and required calling for help in order to be able to come to this facility for further workup and evaluation.     On arrival to the emergency department she was afebrile, her heart rate was 64, respiratory rate 16, she was hypertensive to 186/81, SpO2 94% on ambient air; labs notable for leukocytosis and mild hyponatremia, high-sensitivity troponin 156 -> 160, ECG with sinus rhythm with first-degree AV block, no acute pathology on CT of the head, no acute fracture or traumatic malalignment on CT of the C-spine, plain radiograph of the chest with mild cardiomegaly    Hospital course  Over the course of hospitalization, patient was seen by cardiology who cleared the patient for orthopedic surgery.  Patient had open treatment of the left intertrochanteric proximal femur fracture by way of trochanteric fixation nail on 2/28, tolerated procedure well.   Patient had a PT OT evaluation recommend SNF placement.  Pre-CERT obtained on 3/1, patient discharged in stable condition.  Per orthopedic team recommendation, patient will be discharged on Lovenox for 30 days for DVT prophylaxis.    Pertinent Physical Exam At Time of Discharge  Constitutional: Well developed, awake/alert/oriented x3, no distress, alert and cooperative  Eyes: PERRL, EOMI, clear sclera  ENMT: mucous membranes moist  Head/Neck: Neck supple  Respiratory/Thorax: CTA b/l.   Cardiovascular: Regular, rate and rhythm, no murmurs  Gastrointestinal: Nondistended, soft, non-tender  Musculoskeletal: dressing intact in the left hip  Extremities: minimal edema      Outpatient Follow-Up  No future appointments.      Kenji Julien MD

## 2024-03-01 NOTE — PROGRESS NOTES
"Meka Shelby is a 83 y.o. female on day 3 of admission presenting with Closed displaced intertrochanteric fracture of left femur, initial encounter (CMS/[facility]).    Subjective   Minimal pain mostly with activity   On RA  Denies any fever, chills, chest pain, SOB, abdominal pain, n/v/d or urinary complaints   To be transported to SNF today    Objective     Physical Exam  Vitals reviewed.   HENT:      Head: Normocephalic.      Mouth/Throat:      Mouth: Mucous membranes are moist.   Eyes:      Extraocular Movements: Extraocular movements intact.    Pulmonary:      Effort: on RA, no respiratory distress    Musculoskeletal:      Comments: Left hip has 2 aquacel dressings, one is completely dry & intact, second one is intact but has mild spotting.  light touch sensation is intact, ankle dorsiflexion/plantar flexion is intact. DP 2+/2 palpable     Skin:     General: Skin is warm and dry.    Neurological:      General: No focal deficit present.      Mental Status: She is alert. Mental status is at baseline.   Psychiatric:         Mood and Affect: Mood normal.   Last Recorded Vitals  Blood pressure 143/65, pulse 57, temperature 36 °C (96.8 °F), resp. rate 16, height 1.575 m (5' 2\"), weight 59 kg (130 lb), SpO2 95 %.  Intake/Output last 3 Shifts:  I/O last 3 completed shifts:  In: 720 (12.2 mL/kg) [P.O.:720]  Out: 1600 (27.1 mL/kg) [Urine:1600 (0.8 mL/kg/hr)]  Weight: 59 kg     Relevant Results      Scheduled medications  acetaminophen, 650 mg, oral, q6h OLIVIA  amLODIPine, 2.5 mg, oral, Daily  aspirin, 81 mg, oral, Daily  cholecalciferol, 2,000 Units, oral, Daily  enoxaparin, 40 mg, subcutaneous, Daily  famotidine, 20 mg, oral, Nightly  hydroxychloroquine, 200 mg, oral, BID  isosorbide mononitrate ER, 15 mg, oral, Daily  latanoprost, 1 drop, Both Eyes, Nightly  levothyroxine, 112 mcg, oral, Daily before breakfast  lisinopril, 20 mg, oral, Daily  sennosides, 2 tablet, oral, BID  simvastatin, 10 mg, oral, Nightly      Continuous " medications  oxygen, 2 L/min, Last Rate: Stopped (02/28/24 1630)      PRN medications  PRN medications: benzocaine-menthol, bisacodyl, diphenhydrAMINE, hydrALAZINE, naloxone, naloxone, ondansetron ODT **OR** ondansetron, oxyCODONE, oxyCODONE  Results for orders placed or performed during the hospital encounter of 02/27/24 (from the past 24 hour(s))   Basic Metabolic Panel   Result Value Ref Range    Glucose 149 (H) 74 - 99 mg/dL    Sodium 130 (L) 136 - 145 mmol/L    Potassium 4.0 3.5 - 5.3 mmol/L    Chloride 97 (L) 98 - 107 mmol/L    Bicarbonate 25 21 - 32 mmol/L    Anion Gap 12 10 - 20 mmol/L    Urea Nitrogen 22 6 - 23 mg/dL    Creatinine 0.95 0.50 - 1.05 mg/dL    eGFR 60 (L) >60 mL/min/1.73m*2    Calcium 8.3 (L) 8.6 - 10.3 mg/dL   CBC   Result Value Ref Range    WBC 8.6 4.4 - 11.3 x10*3/uL    nRBC 0.0 0.0 - 0.0 /100 WBCs    RBC 3.12 (L) 4.00 - 5.20 x10*6/uL    Hemoglobin 9.2 (L) 12.0 - 16.0 g/dL    Hematocrit 27.9 (L) 36.0 - 46.0 %    MCV 89 80 - 100 fL    MCH 29.5 26.0 - 34.0 pg    MCHC 33.0 32.0 - 36.0 g/dL    RDW 13.2 11.5 - 14.5 %    Platelets 155 150 - 450 x10*3/uL   Magnesium   Result Value Ref Range    Magnesium 1.87 1.60 - 2.40 mg/dL   Basic Metabolic Panel   Result Value Ref Range    Glucose 93 74 - 99 mg/dL    Sodium 132 (L) 136 - 145 mmol/L    Potassium 3.9 3.5 - 5.3 mmol/L    Chloride 100 98 - 107 mmol/L    Bicarbonate 26 21 - 32 mmol/L    Anion Gap 10 10 - 20 mmol/L    Urea Nitrogen 22 6 - 23 mg/dL    Creatinine 0.75 0.50 - 1.05 mg/dL    eGFR 80 >60 mL/min/1.73m*2    Calcium 8.2 (L) 8.6 - 10.3 mg/dL                Assessment/Plan   Principal Problem:    Closed displaced intertrochanteric fracture of left femur, initial encounter (CMS/[facility])  Active Problems:    Hypertension    Hyperlipidemia    Osteopenia    SLE (systemic lupus erythematosus related syndrome) (CMS/[facility])    DDD (degenerative disc disease), cervical    Lichen planus    Hypothyroidism    Scoliosis    Hyponatremia    Troponin level  elevated    Hypertensive emergency    Left IT femur fracture  POD #2 s/p left TFN  Continue PT/OT, WBAT left   LE  Using incentive spirometer   Reviewed am labs  Multimodal pain regimen  Surgical hip dressing to be removed on post op day #7  VTE prophylaxis: lovenox daily X 30 days  Dispo: SNF Today  Follow up with Dr. Combs in 2 weeks     2. Hyponatremia   Sodium slowly improving, now 132, from 130  No neuro deficits noted  Will defer management to hospitalist team    Seen and evaluated with Dr Shmuel Moreland        I spent 30 minutes in the professional and overall care of this patient.      Harry Ruffin PA-C               *Please note this report has been produced using speech recognition software and may contain errors related to that system including grammar, punctuation and spelling as well as words and phrases that may be inappropriate. If there are questions or concerns, please feel free to contact me to clarify.

## 2024-03-01 NOTE — DISCHARGE INSTRUCTIONS
Surgical Site Care:  Dressing change every days and PRN (as needed) with 4 x 4 and porous tape. No betadine. If glue is present, leave open to air  If Aquacel Ag (rubber) dressing is present, do not remove dressing for 7 days, unless heavily saturated. If heavily saturated, remove dressing and start daily dressing changes as described above   if Staples  will be removed on post-operative day 14 and steri-strips applied  Showering is permitted starting POD1 if waterproof aquacell dressing is present or when incision is covered with waterproof dressing, such as 4 x 4 and tegaderm  Physical Therapy:  Weight Bearing Status:  WBAT   Hip Precautions  Per Physical Therapy handout  Pain Medications  Wean off pain medications as you deem appropriate as long as pain is under control  Cold packs/Ice packs/Machine  May be used 3 times daily for 15-30 minutes as necessary  Be sure to have a barrier (cloth, clothing, towel) between the site and the ice pack to prevent frostbite  Contact [facility] office if  Increased redness, swelling, drainage of any kind, and/or pain to surgery site.  As well as new onset fevers and or chills.  These could signify an infection.  Calf or thigh tenderness to touch as well as increased swelling or redness.  This could signify a clot formation.  Numbness or tingling to an area around the incision site or below the incision site (toes).  Any rash appears, increased  or new onset nausea/vomiting occur.  This may indicate a reaction to a medication.   Phone # [phone].  Follow up with Surgeon  I acknowledge that I have received clara hose and understand the instructions on how and when to wear them (on during the day off at night)

## 2024-03-01 NOTE — CARE PLAN
Problem: Pain  Goal: Free from acute confusion related to pain meds throughout the shift  Outcome: Progressing     Problem: Respiratory  Goal: No signs of respiratory distress (eg. Use of accessory muscles. Peds grunting)  Outcome: Progressing     Problem: Fall/Injury  Goal: Not fall by end of shift  Outcome: Progressing     Problem: Skin  Goal: Participates in plan/prevention/treatment measures  Outcome: Progressing  Goal: Prevent/manage excess moisture  Outcome: Progressing  Goal: Prevent/minimize sheer/friction injuries  Outcome: Progressing   The patient's goals for the shift include pain control and sleeping at least 4 hours during shift.     The clinical goals for the shift include remain hds this shift

## 2024-03-02 LAB
ATRIAL RATE: 64 BPM
ATRIAL RATE: 66 BPM
P AXIS: 49 DEGREES
P AXIS: 57 DEGREES
P OFFSET: 101 MS
P OFFSET: 99 MS
P ONSET: 44 MS
P ONSET: 45 MS
PR INTERVAL: 322 MS
PR INTERVAL: 336 MS
Q ONSET: 205 MS
Q ONSET: 213 MS
QRS COUNT: 10 BEATS
QRS COUNT: 11 BEATS
QRS DURATION: 114 MS
QRS DURATION: 130 MS
QT INTERVAL: 446 MS
QT INTERVAL: 448 MS
QTC CALCULATION(BAZETT): 462 MS
QTC CALCULATION(BAZETT): 467 MS
QTC FREDERICIA: 457 MS
QTC FREDERICIA: 460 MS
R AXIS: -75 DEGREES
R AXIS: -76 DEGREES
T AXIS: 90 DEGREES
T AXIS: 94 DEGREES
T OFFSET: 429 MS
T OFFSET: 436 MS
VENTRICULAR RATE: 64 BPM
VENTRICULAR RATE: 66 BPM

## 2024-03-12 ENCOUNTER — HOSPITAL ENCOUNTER (OUTPATIENT)
Dept: RADIOLOGY | Facility: CLINIC | Age: 84
Discharge: HOME | End: 2024-03-12
Payer: MEDICARE

## 2024-03-12 ENCOUNTER — OFFICE VISIT (OUTPATIENT)
Dept: ORTHOPEDIC SURGERY | Facility: CLINIC | Age: 84
End: 2024-03-12
Payer: MEDICARE

## 2024-03-12 VITALS — HEIGHT: 62 IN | BODY MASS INDEX: 23.92 KG/M2 | WEIGHT: 130 LBS

## 2024-03-12 DIAGNOSIS — M25.551 RIGHT HIP PAIN: ICD-10-CM

## 2024-03-12 DIAGNOSIS — S72.142D CLOSED DISPLACED INTERTROCHANTERIC FRACTURE OF LEFT FEMUR WITH ROUTINE HEALING, SUBSEQUENT ENCOUNTER: ICD-10-CM

## 2024-03-12 PROCEDURE — 1111F DSCHRG MED/CURRENT MED MERGE: CPT | Performed by: ORTHOPAEDIC SURGERY

## 2024-03-12 PROCEDURE — 73502 X-RAY EXAM HIP UNI 2-3 VIEWS: CPT | Mod: LEFT SIDE | Performed by: ORTHOPAEDIC SURGERY

## 2024-03-12 PROCEDURE — 73502 X-RAY EXAM HIP UNI 2-3 VIEWS: CPT | Mod: LT

## 2024-03-12 PROCEDURE — 1125F AMNT PAIN NOTED PAIN PRSNT: CPT | Performed by: ORTHOPAEDIC SURGERY

## 2024-03-12 PROCEDURE — 99024 POSTOP FOLLOW-UP VISIT: CPT | Performed by: ORTHOPAEDIC SURGERY

## 2024-03-12 PROCEDURE — 1159F MED LIST DOCD IN RCRD: CPT | Performed by: ORTHOPAEDIC SURGERY

## 2024-03-12 ASSESSMENT — PAIN SCALES - GENERAL: PAINLEVEL_OUTOF10: 5 - MODERATE PAIN

## 2024-03-12 ASSESSMENT — PAIN - FUNCTIONAL ASSESSMENT: PAIN_FUNCTIONAL_ASSESSMENT: 0-10

## 2024-03-12 NOTE — PROGRESS NOTES
3/12/2024    Chief Complaint   Patient presents with    Left Hip - Post-op     Left hip ORIF with trochanteric nail   DOS- 2/28/24 (13 days out)   X-Rays today        History of Present Illness:  Patient Meka Shelby , 83 y.o. female, presents today, 3/12/2024, for evaluation of left  hip   intertrochanteric femur fracture, 2 weeks postop from TFN .  Patient has done well in the interim since surgery.  She has been residing at skilled nursing facility in the interim after discharge from the hospital.  Minimal to moderate soreness discomfort but overall improving.  She is in a wheelchair today, she has been minimally ambulatory with therapy.       Review of Systems:   GENERAL: Negative  GI: Negative  MUSCULOSKELETAL: See HPI  SKIN: Negative  NEURO:  Negative     Physical Exam:  GENERAL:  Alert and oriented to person, place, and time.  No acute distress and breathing comfortably; pleasant and cooperative with the examination.  HEENT:  Head is normocephalic and atraumatic.  NECK:  Supple, no visible swelling.  CARDIOVASCULAR:  No palpable tachycardia.  LUNGS:  No audible wheezing or labored breathing.  ABDOMEN:  Nondistended.  Extremities: Evaluation of the left lower extremity finds the patient to have a palpable dorsalis pedis pulse to palpation with brisk capillary refill through the toes. The patient has intact sensorium to tibial, sural, saphenous, deep and superficial peroneal nerves to light touch. EHL, FHL, dorsiflexion and plantarflexion are intact to motor. No lymphedema or lymphatic streaking. No signs of deep vein thrombosis. No open wounds. No signs of infection. Supple compartments to the thigh, leg and foot.  Surgical incision is clean dry intact.      Imaging/Test Results:  Radiographs of the left hip show intertrochanteric femur fracture status post cephalomedullary nail fixation.  No evidence of hardware failure migration.  Continued adequate alignment in both planes.     Assessment:  Left hip  intertrochanteric femur fracture, 2 weeks out from TFN.     Plan:  Patient can weight-bear as tolerated in the left lower extremity.  Continue work with therapy on gait and balance training, endurance and strengthening, motion recovery.  Staples were removed in the office today and Steri-Strips applied.  Follow-up again in 6 weeks for repeat clinical and radiographic exam, x-rays 2 views of the left hip upon return.  All questions answered at today's visit.    Mary Grace Ball PA-C

## 2024-04-15 NOTE — PROGRESS NOTES
03/01/24 1310   Discharge Planning   Living Arrangements Alone   Support Systems Children   Type of Residence Private residence   Who is requesting discharge planning? Patient   Home or Post Acute Services Post acute facilities (Rehab/SNF/etc)   Type of Post Acute Facility Services Skilled nursing   Patient expects to be discharged to: [facility] snf   Does the patient need discharge transport arranged? Yes   RoundTrip coordination needed? Yes   Has discharge transport been arranged? Yes   What day is the transport expected? 03/01/24     Received auth. Arrangements have been made for pt transport to [facility] today 3pm by ambulance. Advised the pt and she agrees. Left a message for the pt's dtr. Updated nursing.   no jaundice present

## 2024-04-25 ENCOUNTER — OFFICE VISIT (OUTPATIENT)
Dept: ORTHOPEDIC SURGERY | Facility: CLINIC | Age: 84
End: 2024-04-25
Payer: MEDICARE

## 2024-04-25 ENCOUNTER — HOSPITAL ENCOUNTER (OUTPATIENT)
Dept: RADIOLOGY | Facility: CLINIC | Age: 84
Discharge: HOME | End: 2024-04-25
Payer: MEDICARE

## 2024-04-25 DIAGNOSIS — S72.142D CLOSED DISPLACED INTERTROCHANTERIC FRACTURE OF LEFT FEMUR WITH ROUTINE HEALING, SUBSEQUENT ENCOUNTER: ICD-10-CM

## 2024-04-25 PROCEDURE — 73502 X-RAY EXAM HIP UNI 2-3 VIEWS: CPT | Mod: LEFT SIDE | Performed by: ORTHOPAEDIC SURGERY

## 2024-04-25 PROCEDURE — 99024 POSTOP FOLLOW-UP VISIT: CPT | Performed by: ORTHOPAEDIC SURGERY

## 2024-04-25 PROCEDURE — 1159F MED LIST DOCD IN RCRD: CPT | Performed by: ORTHOPAEDIC SURGERY

## 2024-04-25 PROCEDURE — 73502 X-RAY EXAM HIP UNI 2-3 VIEWS: CPT | Mod: LT

## 2024-04-25 NOTE — PROGRESS NOTES
4/25/2024    Chief Complaint   Patient presents with    Left Hip - Post-op     Left hip ORIF with trochanteric nail   DOS- 2/28/24   X-Rays today        History of Present Illness:  Patient Meka Shelby , 83 y.o. female, presents today, 4/25/2024, for evaluation of left  hip intertrochanteric femur fracture, status post cephalomedullary nail fixation,   2 months postop .  Patient comes in today from skilled nursing facility.  She is on a wheelchair, but states that she is working with therapy to work on ambulating with a walker.  Pain and discomfort to the hip have improved since last visit.  Overall she feels she is making good progress.       Review of Systems:   GENERAL: Negative  GI: Negative  MUSCULOSKELETAL: See HPI  SKIN: Negative  NEURO:  Negative     Physical Exam:  GENERAL:  Alert and oriented to person, place, and time.  No acute distress and breathing comfortably; pleasant and cooperative with the examination.  HEENT:  Head is normocephalic and atraumatic.  NECK:  Supple, no visible swelling.  CARDIOVASCULAR:  No palpable tachycardia.  LUNGS:  No audible wheezing or labored breathing.  ABDOMEN:  Nondistended.  Extremities: Evaluation of the left lower extremity finds the patient to have a palpable dorsalis pedis pulse to palpation with brisk capillary refill through the toes. The patient has intact sensorium to tibial, sural, saphenous, deep and superficial peroneal nerves to light touch. EHL, FHL, dorsiflexion and plantarflexion are intact to motor. No lymphedema or lymphatic streaking. No signs of deep vein thrombosis. No open wounds. No signs of infection. Supple compartments to the thigh, leg and foot.  Previous surgical incisions are well-healed.  She has no pain with logroll on exam.     Imaging/Test Results:  2 views of the left hip taken in the office show evidence of healed intertrochanteric femur fracture status post TFN.  No evidence of hardware failure migration.  Increased healing callus  formation noted.     Assessment:  Left hip intertrochanteric femur fracture, 2 months out from TFN.     Plan:  Patient can continue with weightbearing as tolerated to the left lower extremity.  Continue to work with therapy for strengthening and endurance as well as gait and balance training.  Written instructions provided for skilled nursing facility.  She can follow-up with our office on an as-needed basis.  All questions answered at today's visit.    Mary Grcae Ball PA-C

## (undated) DEVICE — DRILL BIT, 4.2MM 3-FLUTED QC 330MM 100MM CALB

## (undated) DEVICE — SOLUTION, IRRIGATION, SODIUM CHLORIDE 0.9%, 1000 ML, POUR BOTTLE

## (undated) DEVICE — GLOVE, SURGICAL, PROTEXIS PI MICRO, 7.0, PF, LF

## (undated) DEVICE — GUIDEWIRE, 3.2 X 400

## (undated) DEVICE — Device

## (undated) DEVICE — SUTURE, VICRYL, 0, 36 IN, CT-1, UNDYED

## (undated) DEVICE — SOLUTION, IRRIGATION, STERILE WATER, 1000 ML, POUR BOTTLE

## (undated) DEVICE — GLOVE, SURGICAL, PROTEXIS PI MICRO, 7.5, PF, LF

## (undated) DEVICE — APPLICATOR, CHLORAPREP, W/ORANGE TINT, 26ML

## (undated) DEVICE — SUTURE, VICRYL, 2-0, 36 IN, CT-1, UNDYED